# Patient Record
Sex: MALE | Race: WHITE | Employment: FULL TIME | URBAN - METROPOLITAN AREA
[De-identification: names, ages, dates, MRNs, and addresses within clinical notes are randomized per-mention and may not be internally consistent; named-entity substitution may affect disease eponyms.]

---

## 2017-03-09 ENCOUNTER — OFFICE VISIT - RIVER FALLS (OUTPATIENT)
Dept: FAMILY MEDICINE | Facility: CLINIC | Age: 52
End: 2017-03-09

## 2017-11-06 ENCOUNTER — AMBULATORY - RIVER FALLS (OUTPATIENT)
Dept: FAMILY MEDICINE | Facility: CLINIC | Age: 52
End: 2017-11-06

## 2017-12-15 ENCOUNTER — OFFICE VISIT - RIVER FALLS (OUTPATIENT)
Dept: FAMILY MEDICINE | Facility: CLINIC | Age: 52
End: 2017-12-15

## 2018-01-16 ENCOUNTER — COMMUNICATION - RIVER FALLS (OUTPATIENT)
Dept: FAMILY MEDICINE | Facility: CLINIC | Age: 53
End: 2018-01-16

## 2018-01-16 ENCOUNTER — OFFICE VISIT - RIVER FALLS (OUTPATIENT)
Dept: FAMILY MEDICINE | Facility: CLINIC | Age: 53
End: 2018-01-16

## 2018-01-16 ASSESSMENT — MIFFLIN-ST. JEOR: SCORE: 1841.64

## 2018-10-27 ENCOUNTER — OFFICE VISIT - RIVER FALLS (OUTPATIENT)
Dept: FAMILY MEDICINE | Facility: CLINIC | Age: 53
End: 2018-10-27

## 2018-10-27 ASSESSMENT — MIFFLIN-ST. JEOR: SCORE: 1851.62

## 2018-11-01 ENCOUNTER — OFFICE VISIT - RIVER FALLS (OUTPATIENT)
Dept: FAMILY MEDICINE | Facility: CLINIC | Age: 53
End: 2018-11-01

## 2018-11-02 LAB
CHOLEST SERPL-MCNC: 246 MG/DL
CHOLEST/HDLC SERPL: 3.9 {RATIO}
GLUCOSE BLD-MCNC: 92 MG/DL (ref 65–99)
HDLC SERPL-MCNC: 63 MG/DL
LDLC SERPL CALC-MCNC: 152 MG/DL
NONHDLC SERPL-MCNC: 183 MG/DL
TRIGL SERPL-MCNC: 170 MG/DL

## 2018-11-20 ENCOUNTER — OFFICE VISIT - RIVER FALLS (OUTPATIENT)
Dept: FAMILY MEDICINE | Facility: CLINIC | Age: 53
End: 2018-11-20

## 2018-11-20 ASSESSMENT — MIFFLIN-ST. JEOR: SCORE: 1867.26

## 2019-11-21 ENCOUNTER — COMMUNICATION - RIVER FALLS (OUTPATIENT)
Dept: FAMILY MEDICINE | Facility: CLINIC | Age: 54
End: 2019-11-21

## 2019-11-22 ENCOUNTER — OFFICE VISIT - RIVER FALLS (OUTPATIENT)
Dept: FAMILY MEDICINE | Facility: CLINIC | Age: 54
End: 2019-11-22

## 2019-11-22 ASSESSMENT — MIFFLIN-ST. JEOR: SCORE: 1874.52

## 2019-11-25 LAB
BASOPHILS # BLD MANUAL: 28 10*3/UL (ref 0–200)
BASOPHILS NFR BLD MANUAL: 0.6 %
BUN SERPL-MCNC: 20 MG/DL (ref 7–25)
BUN/CREAT RATIO - HISTORICAL: ABNORMAL (ref 6–22)
CALCIUM SERPL-MCNC: 10.2 MG/DL (ref 8.6–10.3)
CANCER AG125 SERPL-ACNC: 4 UNIT/ML
CHLORIDE BLD-SCNC: 100 MMOL/L (ref 98–110)
CHOLEST SERPL-MCNC: 267 MG/DL
CHOLEST/HDLC SERPL: 4.3 {RATIO}
CK SERPL-CCNC: 183 UNIT/L (ref 44–196)
CO2 SERPL-SCNC: 28 MMOL/L (ref 20–32)
CREAT SERPL-MCNC: 1.04 MG/DL (ref 0.7–1.33)
EGFRCR SERPLBLD CKD-EPI 2021: 81 ML/MIN/1.73M2
EOSINOPHIL # BLD MANUAL: 19 10*3/UL (ref 15–500)
EOSINOPHIL NFR BLD MANUAL: 0.4 %
ERYTHROCYTE [DISTWIDTH] IN BLOOD BY AUTOMATED COUNT: 12.4 % (ref 11–15)
ERYTHROCYTE [SEDIMENTATION RATE] IN BLOOD BY WESTERGREN METHOD: 17 MM/H
GLUCOSE BLD-MCNC: 104 MG/DL (ref 65–99)
HCT VFR BLD AUTO: 46.3 % (ref 38.5–50)
HDLC SERPL-MCNC: 62 MG/DL
HGB BLD-MCNC: 16.4 GM/DL (ref 13.2–17.1)
LDLC SERPL CALC-MCNC: 177 MG/DL
LYMPHOCYTES # BLD MANUAL: 935 10*3/UL (ref 850–3900)
LYMPHOCYTES NFR BLD MANUAL: 19.9 %
MCH RBC QN AUTO: 32.7 PG (ref 27–33)
MCHC RBC AUTO-ENTMCNC: 35.4 GM/DL (ref 32–36)
MCV RBC AUTO: 92.2 FL (ref 80–100)
MONOCYTES # BLD MANUAL: 437 10*3/UL (ref 200–950)
MONOCYTES NFR BLD MANUAL: 9.3 %
NEUTROPHILS # BLD MANUAL: 3281 10*3/UL (ref 1500–7800)
NEUTROPHILS NFR BLD MANUAL: 69.8 %
NONHDLC SERPL-MCNC: 205 MG/DL
PLATELET # BLD AUTO: 273 10*3/UL (ref 140–400)
PMV BLD: 10.3 FL (ref 7.5–12.5)
POTASSIUM BLD-SCNC: 4.8 MMOL/L (ref 3.5–5.3)
RBC # BLD AUTO: 5.02 10*6/UL (ref 4.2–5.8)
SODIUM SERPL-SCNC: 140 MMOL/L (ref 135–146)
TRIGL SERPL-MCNC: 141 MG/DL
WBC # BLD AUTO: 4.7 10*3/UL (ref 3.8–10.8)

## 2019-11-26 LAB — C REACTIVE PROTEIN LHE: 2.9 MG/L

## 2019-12-01 ENCOUNTER — COMMUNICATION - RIVER FALLS (OUTPATIENT)
Dept: FAMILY MEDICINE | Facility: CLINIC | Age: 54
End: 2019-12-01

## 2019-12-06 ASSESSMENT — MIFFLIN-ST. JEOR: SCORE: 1838.23

## 2019-12-26 ASSESSMENT — MIFFLIN-ST. JEOR: SCORE: 1814.65

## 2020-09-01 ENCOUNTER — OFFICE VISIT - RIVER FALLS (OUTPATIENT)
Dept: FAMILY MEDICINE | Facility: CLINIC | Age: 55
End: 2020-09-01

## 2020-09-01 LAB
ANION GAP SERPL CALCULATED.3IONS-SCNC: 9 MMOL/L (ref 5–18)
BUN SERPL-MCNC: 18 MG/DL (ref 8–25)
BUN/CREAT RATIO - HISTORICAL: 18 (ref 10–20)
CALCIUM SERPL-MCNC: 9.4 MG/DL (ref 8.5–10.5)
CHLORIDE BLD-SCNC: 105 MMOL/L (ref 98–110)
CO2 SERPL-SCNC: 27 MMOL/L (ref 21–31)
CREAT SERPL-MCNC: 0.99 MG/DL (ref 0.72–1.25)
ERYTHROCYTE [DISTWIDTH] IN BLOOD BY AUTOMATED COUNT: 12.5 % (ref 11.5–15.5)
ERYTHROCYTE [SEDIMENTATION RATE] IN BLOOD BY WESTERGREN METHOD: 15 MM/HR
GFR ESTIMATE EXT - HISTORICAL: >60
GLUCOSE BLD-MCNC: 115 MG/DL (ref 65–100)
HCT VFR BLD AUTO: 45.7 % (ref 37–53)
HGB BLD-MCNC: 16.2 G/DL (ref 13.5–17.5)
MCH RBC QN AUTO: 33.2 PG (ref 26–34)
MCHC RBC AUTO-ENTMCNC: 35.4 G/DL (ref 32–36)
MCV RBC AUTO: 94 FL (ref 80–100)
PLATELET # BLD AUTO: 214 10*3/UL (ref 140–440)
PMV BLD: 9.8 FL (ref 6.5–11)
POTASSIUM BLD-SCNC: 4.1 MMOL/L (ref 3.5–5)
RBC # BLD AUTO: 4.88 10*6/UL (ref 4.3–5.9)
SODIUM SERPL-SCNC: 141 MMOL/L (ref 135–145)
WBC # BLD AUTO: 4 10*3/UL (ref 4.5–11)

## 2020-09-02 ENCOUNTER — COMMUNICATION - RIVER FALLS (OUTPATIENT)
Dept: FAMILY MEDICINE | Facility: CLINIC | Age: 55
End: 2020-09-02

## 2020-09-02 LAB — C REACTIVE PROTEIN LHE: 1.5 MG/L

## 2020-09-09 ENCOUNTER — OFFICE VISIT - RIVER FALLS (OUTPATIENT)
Dept: FAMILY MEDICINE | Facility: CLINIC | Age: 55
End: 2020-09-09

## 2020-09-09 ASSESSMENT — MIFFLIN-ST. JEOR: SCORE: 1828.25

## 2020-09-10 ENCOUNTER — RECORDS - HEALTHEAST (OUTPATIENT)
Dept: ADMINISTRATIVE | Facility: OTHER | Age: 55
End: 2020-09-10

## 2021-01-25 ENCOUNTER — OFFICE VISIT - RIVER FALLS (OUTPATIENT)
Dept: FAMILY MEDICINE | Facility: CLINIC | Age: 56
End: 2021-01-25

## 2021-01-25 ASSESSMENT — MIFFLIN-ST. JEOR: SCORE: 1800.13

## 2021-02-12 ENCOUNTER — OFFICE VISIT - RIVER FALLS (OUTPATIENT)
Dept: FAMILY MEDICINE | Facility: CLINIC | Age: 56
End: 2021-02-12

## 2021-02-12 ENCOUNTER — HOSPITAL ENCOUNTER (OUTPATIENT)
Dept: CT IMAGING | Facility: CLINIC | Age: 56
Discharge: HOME | End: 2021-02-12
Attending: INTERNAL MEDICINE

## 2021-02-12 DIAGNOSIS — E78.5 HYPERLIPIDEMIA: ICD-10-CM

## 2021-02-12 LAB
CV CALCIUM SCORE AGATSTON LM: 0
CV CALCIUM SCORING AGATSON LAD: 4
CV CALCIUM SCORING AGATSTON CX: 18
CV CALCIUM SCORING AGATSTON RCA: 0
CV CALCIUM SCORING AGATSTON TOTAL: 22

## 2021-02-12 ASSESSMENT — MIFFLIN-ST. JEOR: SCORE: 1790.15

## 2021-02-17 LAB
BUN SERPL-MCNC: 18 MG/DL (ref 7–25)
BUN/CREAT RATIO - HISTORICAL: NORMAL (ref 6–22)
CALCIUM SERPL-MCNC: 10.1 MG/DL (ref 8.6–10.3)
CHLORIDE BLD-SCNC: 101 MMOL/L (ref 98–110)
CHOLEST SERPL-MCNC: 231 MG/DL
CHOLEST/HDLC SERPL: 3 {RATIO}
CO2 SERPL-SCNC: 29 MMOL/L (ref 20–32)
CREAT SERPL-MCNC: 0.98 MG/DL (ref 0.7–1.33)
EGFRCR SERPLBLD CKD-EPI 2021: 86 ML/MIN/1.73M2
GLUCOSE BLD-MCNC: 94 MG/DL (ref 65–99)
HDLC SERPL-MCNC: 76 MG/DL
LDLC SERPL CALC-MCNC: 134 MG/DL
NONHDLC SERPL-MCNC: 155 MG/DL
POTASSIUM BLD-SCNC: 4.4 MMOL/L (ref 3.5–5.3)
SODIUM SERPL-SCNC: 140 MMOL/L (ref 135–146)
TRIGL SERPL-MCNC: 104 MG/DL
VITAMIN B6: 24.3 NG/ML (ref 2.1–21.7)

## 2021-04-14 ENCOUNTER — COMMUNICATION - RIVER FALLS (OUTPATIENT)
Dept: FAMILY MEDICINE | Facility: CLINIC | Age: 56
End: 2021-04-14

## 2021-06-27 ENCOUNTER — HEALTH MAINTENANCE LETTER (OUTPATIENT)
Age: 56
End: 2021-06-27

## 2021-10-17 ENCOUNTER — HEALTH MAINTENANCE LETTER (OUTPATIENT)
Age: 56
End: 2021-10-17

## 2022-02-11 VITALS
BODY MASS INDEX: 29.12 KG/M2 | SYSTOLIC BLOOD PRESSURE: 128 MMHG | WEIGHT: 209.8 LBS | SYSTOLIC BLOOD PRESSURE: 132 MMHG | HEIGHT: 72 IN | DIASTOLIC BLOOD PRESSURE: 78 MMHG | DIASTOLIC BLOOD PRESSURE: 82 MMHG | BODY MASS INDEX: 30.24 KG/M2 | BODY MASS INDEX: 28.42 KG/M2 | WEIGHT: 215 LBS | HEART RATE: 92 BPM | HEIGHT: 72 IN | SYSTOLIC BLOOD PRESSURE: 146 MMHG | HEIGHT: 72 IN | DIASTOLIC BLOOD PRESSURE: 80 MMHG | TEMPERATURE: 98.7 F

## 2022-02-11 VITALS
DIASTOLIC BLOOD PRESSURE: 76 MMHG | HEIGHT: 72 IN | WEIGHT: 206.6 LBS | SYSTOLIC BLOOD PRESSURE: 158 MMHG | WEIGHT: 204.4 LBS | HEART RATE: 111 BPM | OXYGEN SATURATION: 97 % | TEMPERATURE: 99.2 F | SYSTOLIC BLOOD PRESSURE: 136 MMHG | BODY MASS INDEX: 27.68 KG/M2 | HEIGHT: 72 IN | OXYGEN SATURATION: 96 % | BODY MASS INDEX: 27.98 KG/M2 | DIASTOLIC BLOOD PRESSURE: 80 MMHG | TEMPERATURE: 97.2 F | HEART RATE: 88 BPM

## 2022-02-11 VITALS
HEART RATE: 64 BPM | BODY MASS INDEX: 28.82 KG/M2 | HEART RATE: 64 BPM | BODY MASS INDEX: 29.16 KG/M2 | HEIGHT: 72 IN | SYSTOLIC BLOOD PRESSURE: 160 MMHG | DIASTOLIC BLOOD PRESSURE: 104 MMHG | DIASTOLIC BLOOD PRESSURE: 80 MMHG | TEMPERATURE: 98.2 F | SYSTOLIC BLOOD PRESSURE: 152 MMHG | WEIGHT: 212.8 LBS | TEMPERATURE: 96 F | WEIGHT: 215 LBS

## 2022-02-11 VITALS
TEMPERATURE: 97.7 F | BODY MASS INDEX: 28.68 KG/M2 | HEART RATE: 60 BPM | SYSTOLIC BLOOD PRESSURE: 140 MMHG | WEIGHT: 214.4 LBS | DIASTOLIC BLOOD PRESSURE: 78 MMHG

## 2022-02-11 VITALS
TEMPERATURE: 96.9 F | RESPIRATION RATE: 18 BRPM | TEMPERATURE: 97.4 F | WEIGHT: 223 LBS | HEIGHT: 73 IN | OXYGEN SATURATION: 98 % | SYSTOLIC BLOOD PRESSURE: 136 MMHG | BODY MASS INDEX: 28.36 KG/M2 | HEART RATE: 88 BPM | RESPIRATION RATE: 16 BRPM | OXYGEN SATURATION: 95 % | WEIGHT: 214 LBS | DIASTOLIC BLOOD PRESSURE: 78 MMHG | DIASTOLIC BLOOD PRESSURE: 82 MMHG | HEART RATE: 72 BPM | SYSTOLIC BLOOD PRESSURE: 132 MMHG

## 2022-02-11 VITALS
HEIGHT: 73 IN | BODY MASS INDEX: 29.99 KG/M2 | WEIGHT: 221.4 LBS | HEART RATE: 80 BPM | HEART RATE: 94 BPM | HEIGHT: 72 IN | BODY MASS INDEX: 28.65 KG/M2 | TEMPERATURE: 98 F | WEIGHT: 216.2 LBS | DIASTOLIC BLOOD PRESSURE: 80 MMHG | TEMPERATURE: 97.9 F | SYSTOLIC BLOOD PRESSURE: 134 MMHG | DIASTOLIC BLOOD PRESSURE: 88 MMHG | OXYGEN SATURATION: 97 % | SYSTOLIC BLOOD PRESSURE: 138 MMHG

## 2022-02-16 NOTE — NURSING NOTE
Comprehensive Intake Entered On:  9/9/2020 8:12 AM CDT    Performed On:  9/9/2020 8:08 AM CDT by Beckie Canchola CMA               Summary   Chief Complaint :   f/u per [facility] - ha's improved, BP improving, ringing in ears improved    Advance Directive :   No   Weight Measured :   212.8 lb(Converted to: 212 lb 13 oz, 96.52 kg)    Height Measured :   72 in(Converted to: 6 ft 0 in, 182.88 cm)    Body Mass Index :   28.86 kg/m2 (HI)    Body Surface Area :   2.21 m2   Systolic Blood Pressure :   156 mmHg (HI)    Diastolic Blood Pressure :   80 mmHg   Mean Arterial Pressure :   105 mmHg   Peripheral Pulse Rate :   64 bpm   Temperature Tympanic :   96 DegF(Converted to: 35.6 DegC)  (LOW)    Race :      Languages :   English   Beckie Canchola CMA - 9/9/2020 8:08 AM CDT   Health Status   Allergies Verified? :   Yes   Medication History Verified? :   Yes   Medical History Verified? :   Yes   Pre-Visit Planning Status :   Completed   Tobacco Use? :   Never smoker   Beckie Canchola CMA - 9/9/2020 8:08 AM CDT   Demographics   Last Name :   Padilla   Address :   [address] [place] [address]   First Name :   Crow Alejandro Initial :   BETH   City :   [place]   State :   [place]   Zip Code :   [address]   Beckie Canchola CMA - 9/9/2020 8:08 AM CDT   Providers Grid   Provider Name :    Dr. Onofre              Provider Specialty :    Dentist                Beckie Canchola CMA - 9/9/2020 8:08 AM CDT         Ancillary Services Grid   Name :    Radha              Type of Service :    Pharmacy                Beckie Canchola CMA - 9/9/2020 8:08 AM CDT         ID Risk Screen   Recent Travel History :   No recent travel   Family Member Travel History :   No recent travel   Other Exposure to Infectious Disease :   Unknown   Beckie Canchola CMA - 9/9/2020 8:08 AM CDT   Social History   Social History   (As Of: 9/9/2020 8:12:01 AM CDT)   Alcohol:        Current, Beer (12 oz), Wine (5 oz), 3-5 times per week, 2 drinks/episode average.  3 drinks/episode maximum.    (Last Updated: 3/9/2017 2:23:21 PM CST by Adwoa Esqueda)          Tobacco:        Never   (Last Updated: 2/6/2012 5:14:03 PM CST by Beckie Canchola CMA)          Substance Abuse:        Never   (Last Updated: 11/8/2010 5:56:17 PM CST by Yasmine Jaime)          Employment/School:        Employed, Work/School description: .   (Last Updated: 2/22/2012 11:29:03 AM CST by Alexandra Antunez CMA)          Home/Environment:        Marital status: .   (Last Updated: 2/22/2012 11:29:17 AM CST by Alexandra Antunez CMA)          Exercise:        Exercise frequency: 1-2 times/week.   (Last Updated: 11/8/2010 5:51:52 PM CST by Yasmine Jaime)   Principal Discharge DX:	Facial laceration, initial encounter

## 2022-02-16 NOTE — NURSING NOTE
Comprehensive Intake Entered On:  11/22/2019 3:33 PM CST    Performed On:  11/22/2019 3:27 PM CST by Mendy Del Valle               Summary   Chief Complaint :   Annual px, c/o tightness/ tingling in arms, pain and swelling in armpits, numbness in hands, lips are tingling, x 2 weeks   Advance Directive :   No   Weight Measured :   223.0 lb(Converted to: 223 lb 0 oz, 101.15 kg)    Height Measured :   72 in(Converted to: 6 ft 0 in, 182.88 cm)    Body Mass Index :   30.24 kg/m2 (HI)    Body Surface Area :   2.26 m2   Systolic Blood Pressure :   168 mmHg (HI)    Diastolic Blood Pressure :   90 mmHg (HI)    Mean Arterial Pressure :   116 mmHg   Peripheral Pulse Rate :   92 bpm   BP Site :   Right arm   Pulse Site :   Radial artery   BP Method :   Manual   HR Method :   Manual   Temperature Tympanic :   98.7 DegF(Converted to: 37.1 DegC)    Race :      Languages :   English   Mendy Del Valle - 11/22/2019 3:27 PM CST   Health Status   Allergies Verified? :   Yes   Medication History Verified? :   Yes   Medical History Verified? :   Yes   Pre-Visit Planning Status :   Completed   Tobacco Use? :   Never smoker   Mendy Del Valle - 11/22/2019 3:27 PM CST   Consents   Consent for Immunization Exchange :   Consent Granted   Consent for Immunizations to Providers :   Consent Granted   Mendy Del Valle - 11/22/2019 3:27 PM CST   Meds / Allergies   (As Of: 11/22/2019 3:33:41 PM CST)   Allergies (Active)   No known allergies  Estimated Onset Date:   Unspecified ; Created By:   Adriana March RN; Reaction Status:   Active ; Category:   Drug ; Substance:   No known allergies ; Type:   Allergy ; Updated By:   Adriana March RN; Reviewed Date:   11/22/2019 3:30 PM CST        Medication List   (As Of: 11/22/2019 3:33:41 PM CST)   No Known Home Medications     Mendy Del Valle - 11/22/2019 3:30:48 PM      Prescription/Discharge Order    oseltamivir  :   oseltamivir ; Status:   Completed ; Ordered As Mnemonic:    Tamiflu 75 mg oral capsule ; Simple Display Line:   75 mg, 1 cap(s), PO, BID, for 5 day(s), 10 cap(s), 0 Refill(s) ; Ordering Provider:   Prudencio lOivo MD; Catalog Code:   oseltamivir ; Order Dt/Tm:   3/1/2019 10:55:57 AM CST

## 2022-02-16 NOTE — PROGRESS NOTES
Chief Complaint    c/o bilateral arm tingling x2 weeks.  fell on ice, feels that is what is causing issues.  History of Present Illness       Patient with tingling in his fingers       2 weeks ago he slipped and fell backwards onto his back.  It was a hard fall and shook him up he did not have any specific pain just some general aching but he did develop some tingling in his fingertips both hands involved equally.  He does not have any neck pain but now 2 weeks later he still has a little tingling in his fingers.  He has been very active with vigorous activities and did not struggle or have the sensation change.  He is especially worried because he is leaving for a [email or web address] vacation for the next week tomorrow.  Review of Systems       No neck pain, no headaches, no visual changes, no arm weakness, no chest pain  Physical Exam   Vitals & Measurements    T: 97.2  F (Tympanic)  HR: 111 (Peripheral)  BP: 158/76  SpO2: 97%     HT: 72 in  WT: 206.6 lb  BMI: 28.02        Alert and oriented       Normal strength in the shoulders elbows wrist and hand no neck tenderness on palpation sensations grossly intact but he does have a subjective tingling mostly in the fingertips of both hands  Assessment/Plan       1. Paresthesia (R20.2)          Several weeks out from a fall having some paresthesias that are bilateral he certainly could have had some swelling that resulted in his sensation but it is very reassuring that he has no neck pain and he has been very physically active without difficulty recommended a course of prednisone if after his vacation he still having symptoms some cervical imaging would be appropriate       Orders:         predniSONE, = 1 tab(s) ( 20 mg ), PO, Daily, # 7 tab(s), 0 Refill(s), Type: Maintenance, Pharmacy: [id number], 1 tab(s) Oral daily,x7 day(s), 72, in, 01/25/21 14:07:00 CST, Height Measured, 206.6, lb, 01/25/21 14:07:00 CST, Weight Measured, (Ordered)  Patient Information      Name:CHESTER MEDELLIN      Address:      [address] [place] [phone]     Sex:Male     YOB: 1965     [phone]     Emergency Contact:JAZMYN MEDELLIN     MRN:[id number]     FIN:[id number]     Location:[facility]     Date of Service:01/25/2021      Primary Care Physician:       Prudencio Olivo MD, [phone]      Attending Physician:       Doron Evans MD, [phone]  Problem List/Past Medical History    Ongoing     Corneal scarring       Comments: right eye superiorly - most likely seconardy to herpes zoster     GERD (gastroesophageal reflux disease)     Hiatal hernia     IBS (irritable bowel syndrome)     Obesity    Historical     Epidermoid cyst     HA (headache)     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     Coronary artery calcium score (06/27/2014)      Comments: Agatston Calcium score - 0.     Vasectomy (2004)     Cholecystectomy (08/1998)     History of repair of inguinal hernia (1974)  Medications    predniSONE 20 mg oral tablet, 20 mg= 1 tab(s), Oral, daily  Allergies    No Known Medication Allergies  Social History    Smoking Status     Never smoker     Alcohol      Current, Beer (12 oz), Wine (5 oz), 3-5 times per week, 2 drinks/episode average. 3 drinks/episode maximum.     Electronic Cigarette/Vaping      Electronic Cigarette Use: Never.     Employment/School      Employed, Work/School description: .     Exercise      Exercise frequency: 1-2 times/week.     Home/Environment      Marital status: .     Substance Abuse      Never     Tobacco      Never (less than 100 in lifetime)  Family History    Deep vein thrombosis: Father.    Kidney stone: Brother.    Suicide:  Father.  Immunizations      Vaccine Date Status          tetanus/diphth/pertuss (Tdap) adult/adol 12/13/2018 Recorded          influenza virus vaccine, inactivated 12/13/2018 Recorded          tetanus/diphth/pertuss (Tdap) adult/adol 12/18/2007 Recorded

## 2022-02-16 NOTE — LETTER
(Inserted Image. Unable to display)     November 29, 2019      CHESTER MEDELLIN  [address], [place] [phone]          Dear CHESTER,      Thank you for selecting [facility] (previously [facility]) for your healthcare needs.      Our records indicate you are due for the following services:     Annual Physical.      To schedule an appointment or if you have further questions, please contact your primary clinic:   [facility]       [phone]   [facility]       [phone]              [facility]     [phone]      Powered by [employer] and [email or web address]    Sincerely,    Prudencio Olivo MD

## 2022-02-16 NOTE — NURSING NOTE
Quick Intake Entered On:  11/29/2019 11:01 AM CST    Performed On:  11/29/2019 11:01 AM CST by Kallie Bradshaw RN               Summary   Advance Directive :   No   Height Measured :   72 in(Converted to: 6 ft 0 in, 182.88 cm)    Systolic Blood Pressure :   146 mmHg (HI)    Diastolic Blood Pressure :   82 mmHg (HI)    Mean Arterial Pressure :   103 mmHg   Race :      Languages :   English   Kallie Bradshaw RN - 11/29/2019 11:01 AM CST

## 2022-02-16 NOTE — NURSING NOTE
Comprehensive Intake Entered On:  12/6/2019 8:42 AM CST    Performed On:  12/6/2019 8:42 AM CST by Larissa Murphy               Summary   Chief Complaint :   BP and weight check   Advance Directive :   No   Weight Measured :   215 lb(Converted to: 215 lb 0 oz, 97.52 kg)    Height Measured :   72 in(Converted to: 6 ft 0 in, 182.88 cm)    Body Mass Index :   29.16 kg/m2 (HI)    Body Surface Area :   2.22 m2   Systolic Blood Pressure :   128 mmHg   Diastolic Blood Pressure :   84 mmHg (HI)    Mean Arterial Pressure :   99 mmHg   Race :      Languages :   English   Larissa Murphy - 12/6/2019 8:42 AM CST

## 2022-02-16 NOTE — PROGRESS NOTES
Patient:   CHESTER MEDELLIN            MRN: [id number]            FIN: [id number]               Age:   56 years     Sex:  Male     :  1965   Associated Diagnoses:   Hyperlipemia; Physical exam; Peripheral neuropathy   Author:   Prudencio Olivo MD      Visit Information      Date of Service: 2021 04:35 pm  [facility]  Encounter#: [id number]      Primary Care Provider (PCP):  Prudencio Olivo MD    NPI# [id number]      Referring Provider:  Prudencio Olivo MD    NPI# [id number]      Chief Complaint   2021 4:47 PM CST    1.  f/u parestesia in BL arsm- noticed more sx after the hard fall a few wks ago, ongoing lethargy.  2.  review coronary calcium score from today  3.  ST x today              Additional Information:No additional information recorded during visit.   Chief complaint and symptoms as noted above and confirmed with patient      History of Present Illness       2020: Crow presents to clinic for 1 week follow-up after seeing [facility] this past week for sinus congestion and sinusitis complaints.  Labs unremarkable except for a mild leukopenia.  He shares having off-and-on symptoms for nearly a month with this illness.  His wife had similar episode as well.  Neither were tested for coronavirus.  He feels much better compared to last week.  Did take Sudafed on multiple days.  Brings in a recording of home blood pressures with systolic pressures typically in the 130s.  21: Crow presents to clinic with continued paresthesias.  Seen by JAYLON late January with similar symptoms lasting for approximately 2 weeks.  Describes a numbness sensation primarily in the fingertips involving both hands.  Some numbness features from elbows on down in both arms.  Describes mainly a symmetric like sensations for both hands.  No associated pains no weakness.  No neck pains.  Does describe similar like numb feelings going down both legs and in the toes.  No balance difficulties.  No  history of similar symptoms in the past.  Also describes approximately 3-day history of general malaise and sore throat.  Recently returned from 1 week vacation in [place] approximately 1 week ago.  He did require traveling Covid testing prior to the departure which was normal.          Review of Systems   Constitutional:  No fever, No chills.    Eye:  Negative except as documented in history of present illness.    Ear/Nose/Mouth/Throat:  No nasal congestion.    Respiratory:  No shortness of breath, No cough, No sputum production.    Cardiovascular:  No chest pain, No palpitations, No peripheral edema, No syncope.    Gastrointestinal:  No nausea, No vomiting, No abdominal pain.    Genitourinary   Hematology/Lymphatics:  No swollen lymph glands.    Endocrine   Immunologic:  No malaise.    Musculoskeletal:  No joint pain, No muscle pain.    Neurologic:  Alert and oriented X4, Numbness, Tingling, Headache.       Health Status   Allergies:    Allergic Reactions (Selected)  No Known Medication Allergies   Medications:  (Selected)   ,    Medications          No Known Home Medications     Problem list:    All Problems  Corneal scarring / SNOMED CT 557199169 / Confirmed  Hiatal hernia / SNOMED CT 212052808 / Confirmed  IBS (irritable bowel syndrome) / SNOMED CT 96053782 / Confirmed  Obesity / SNOMED CT 8479140171 / Probable  Inactive: GERD (gastroesophageal reflux disease) / SNOMED CT 602748298  Resolved: Epidermoid cyst / SNOMED CT 9961104796  Resolved: HA (headache) / SNOMED CT 12153388  Resolved: Herpes zoster / SNOMED CT 7886753  Resolved: History of inguinal hernia repair / SNOMED CT 901714U0-Y5A2-29KO-[id number]  Resolved: Urticaria / SNOMED CT 36906719  Canceled: Hypothyroidism / ICD-9-.9      Histories   Past Medical History:    Active  Corneal scarring (217478740): Onset in 2007 at 41 years.  Comments:  10/29/2012 CDT 12:05 PM CDT - Amy Bearden  right eye superiorly - most likely seconardy  to herpes zoster  Hiatal hernia (552283754)  IBS (irritable bowel syndrome) (75100039)  Resolved  Epidermoid cyst (2957086315): Onset on 2012 at 47 years.  Resolved.  Herpes zoster (8034053): Onset in the month of 2004 at 39 years  Resolved.  Comments:  2010 CST 5:54 PM CST - Yasmine Jaime  trigeminal nerve ophthalmic branch distribution  History of inguinal hernia repair ([id number]): Onset in  at 9 years.  Resolved.  Urticaria (84681050):  Resolved in  at 34 years.  HA (headache) (14229498):  Resolved.   Family History:    Suicide  Father ()  Kidney stone  Brother  Deep vein thrombosis  Father ()     Procedure history:    Cardiac computed tomography for calcium scoring (6937778030) on 2021 at 56 Years.  Comments:  2021 4:36 PM CST - Trina Milton  Total Agatston calcium score is 22.  Screening for colon cancer (8432397214) on 12/10/2018 at 53 Years.  Comments:  2018 3:50 PM CST - Becky Pimentel MA result:  negative  Recommendation:  f/u 3yrs  Coronary artery calcium score (2000437484) on 2014 at 49 Years.  Comments:  2016 7:22 AM CST - Beckie Canchola CMA  Agatston Calcium score - 0  Vasectomy (63.73) in  at 39 Years.  Cholecystectomy (59818760) in the month of 1998 at 33 Years.  History of repair of inguinal hernia (6351123055) in  at 9 Years.   Social History:        Electronic Cigarette/Vaping Assessment            Electronic Cigarette Use: Never.      Alcohol Assessment            Current, Beer (12 oz), Wine (5 oz), 3-5 times per week, 2 drinks/episode average.  3 drinks/episode maximum.      Tobacco Assessment            Never (less than 100 in lifetime)      Substance Abuse Assessment            Never      Employment and Education Assessment            Employed, Work/School description: .      Home and Environment Assessment            Marital status: .      Exercise and  Physical Activity Assessment            Exercise frequency: 1-2 times/week.        Physical Examination   vital signs stable, as noted above   VS/Measurements   General:  Alert and oriented, No acute distress.    Neck:  Supple, Non-tender, No lymphadenopathy.    Respiratory:  Lungs are clear to auscultation, Respirations are non-labored, Breath sounds are equal.    Cardiovascular:  Normal rate, Regular rhythm, No murmur, No edema.    Musculoskeletal:  normal phalen,tinnels test.    Neurologic:  Alert, Oriented.    Cognition and Speech:  Oriented, Speech clear and coherent.    Psychiatric:  Appropriate mood & affect.       Review / Management   Results review      Impression and Plan   Diagnosis     Hyperlipemia (DWG68-HC E78.5).     Physical exam (RBQ97-HO Z00.00).     Peripheral neuropathy (KAW30-JO G62.9).       .) sub-acute (2 month) h/o paresthesias, symmetric involvement through forearms/hands (all fingers); aggravated at night with particular positioning   - seen in clinic on 1/25 with same complaints - focus on cervical origin (had fallen while walking dog) - no symptom improvement on prednisone    - no associated weakness, no neck pains    - possibly some CTS features, though symmetry and foot involvement argues against    - mild numbness in feet as well   - non-diabetic   - will check B12, B6, TSH, MMA, SPEP   - if persists, would have him see neurology for EMG, reassessment    .) HLPD   - low risk for future cardiac events   - normal coronary calcium scoring (6/2014)   - repeat scan (2/201): raw score 22 (25-50th percentile)    .) generalized malaise   - [place]; pre-travel COVID test negative   - consensus to hold off on covid testing today    plan as previously outlined:     .) health maintenance   - CRC due 12/21   - declines PSA screening   - adult vaccinations updated   - h/o superficial thrombophlebitis    RTC in 6 months

## 2022-02-16 NOTE — PROGRESS NOTES
Patient:   CHESTER MEDELLIN            MRN: [id number]            FIN: [id number]               Age:   52 years     Sex:  Male     :  1965   Associated Diagnoses:   Thrombophlebitis; Superficial vein thrombosis   Author:   Vaughn Interiano PA-C      Chief Complaint   2018 11:43 AM CST   Pt here for redness and pain on inner left knee/inner thigh x 9 days  (Modified)      History of Present Illness   Chief complaint and symptoms noted above and confirmed with patient   as above,  tightness and tenderness of medial distal thigh  no pain with exercise  no treatments  no recent travel hx      Review of Systems   Constitutional:  Negative.    Ear/Nose/Mouth/Throat:  Negative.    Respiratory:  Negative.    Cardiovascular:  Negative.       Health Status   Allergies:    Allergic Reactions (Selected)  No known allergies   Medications: not on any regular medications   Problem list:    All Problems (Selected)  Hiatal hernia / SNOMED CT 862149136 / Confirmed  Corneal Scarring / ICD-9-.00 / Confirmed  IBS (Irritable Bowel Syndrome) / ICD-9-.1 / Confirmed      Histories   Past Medical History:    Active  Corneal Scarring (371.00): Onset in  at 41 years.  Comments:  10/29/2012 CDT 12:05 PM CDT - Amy Bearden  right eye superiorly - most likely seconardy to herpes zoster  Hiatal hernia (473408286)  IBS (Irritable Bowel Syndrome) (564.1)  Resolved  Epidermoid Cyst (706.2): Onset on 2012 at 47 years.  Resolved.  Herpes Zoster (053.9): Onset in the month of 2004 at 39 years  Resolved.  Comments:  2010 CST 5:54 PM CST - Yasmine Jaime  trigeminal nerve ophthalmic branch distribution  History of inguinal hernia repair ([id number]): Onset in  at 9 years.  Resolved.  Urticaria (708.9):  Resolved in  at 34 years.  HA (Headache) (784.0):  Resolved.   Family History:    Suicide  Father ()  Kidney stone  Brother  Deep vein thrombosis  Father ()      Procedure history:    Coronary artery calcium score (6978431622) on 6/27/2014 at 49 Years.  Comments:  2/9/2016 7:22 AM - Beckie Canchola CMAtston Calcium score - 0  Vasectomy (63.73) in 2004 at 39 Years.  Cholecystectomy (23772153) in the month of 8/1998 at 33 Years.  History of repair of inguinal hernia (7554582018) in 1974 at 9 Years.      Physical Examination   Vital Signs   1/16/2018 11:43 AM CST Temperature Tympanic 96.9 DegF  LOW    Peripheral Pulse Rate 72 bpm    Pulse Site Radial artery    Respiratory Rate 16 br/min    Systolic Blood Pressure 136 mmHg  HI    Diastolic Blood Pressure 78 mmHg    Mean Arterial Pressure 97 mmHg    BP Site Right arm    Oxygen Saturation 98 %      Measurements from flowsheet : Measurements   1/16/2018 11:43 AM CST Height Measured - Standard 72.50 in    Weight Measured - Standard 214 lb    BSA 2.23 m2    Body Mass Index 28.62 kg/m2  HI      General:  No acute distress.    Respiratory:  Lungs are clear to auscultation.    Cardiovascular:  Normal rate, Regular rhythm, No murmur, on medial aspect of left inner thigh there is a 4 cm ropy vein, mildly tender, slight inflammation, no pain with flexion and extension of leg.       Review / Management   Results review:  Lab results   1/16/2018 12:19 PM CST D-Dimer 1.70    D-Dimer Interp Abnormal   .    Radiology results   left leg U/S is negative for DVT, but positive for superficial thrombus in greater saphenous vein >5 cm       Impression and Plan   Diagnosis     Thrombophlebitis (QUV85-GC I80.9).     Superficial vein thrombosis (JNC77-JI I82.890).     Summary:  will treat with Xarelto 10 mg qd for 45 days, repeat U/S in 60 days, follow up if other sxs develop.    Orders     Orders   Requests (Lab):  D-Dimer (Request) (Order): Priority: Urgent, Thrombophlebitis.     Orders   Requests (Radiology):  US Lower Extremity Venous Doppler (Request) (Order): Instructions: Left distal medial thigh, Thrombophlebitis.     Orders    Pharmacy:  Xarelto 10 mg oral tablet (Prescribe): 1 tab(s) ( 10 mg ), po, daily, # 45 tab(s), 0 Refill(s), Type: Maintenance, [facility], 1 tab(s) po daily.     Orders   Requests (Radiology):  US Lower Extremity Venous Doppler (Request) (Order): Instructions: Left SVT, Superficial vein thrombosis.     Orders   Charges (Evaluation and Management):  24422 office outpatient visit 15 minutes (Charge) (Order): Quantity: 1, Superficial vein thrombosis.

## 2022-02-16 NOTE — NURSING NOTE
CAGE Assessment Entered On:  9/9/2020 12:55 PM CDT    Performed On:  9/9/2020 12:55 PM CDT by Beckie Canchola CMA               Assessment   Have you ever felt you should cut down on your drinking :   No   Have people annoyed you by criticizing your drinking :   No   Have you ever felt bad or guilty about your drinking :   No   Have you ever taken a drink first thing in the morning to steady your nerves or get rid of a hangover (Eye-opener) :   No   CAGE Score :   0    Beckie Canchola CMA - 9/9/2020 12:55 PM CDT

## 2022-02-16 NOTE — NURSING NOTE
Comprehensive Intake Entered On:  1/25/2021 2:12 PM CST    Performed On:  1/25/2021 2:07 PM CST by Margarito BENNETT, Becky               Summary   Chief Complaint :   c/o bilateral arm tingling x2 weeks.  fell on ice, feels that is what is causing issues.   Advance Directive :   No   Weight Measured :   206.6 lb(Converted to: 206 lb 10 oz, 93.712 kg)    Height Measured :   72 in(Converted to: 6 ft 0 in, 182.88 cm)    Body Mass Index :   28.02 kg/m2 (HI)    Body Surface Area :   2.18 m2   Systolic Blood Pressure :   158 mmHg (HI)    Diastolic Blood Pressure :   76 mmHg   Mean Arterial Pressure :   103 mmHg   Peripheral Pulse Rate :   111 bpm (HI)    BP Site :   Right arm   Pulse Site :   Radial artery   BP Method :   Manual   HR Method :   Manual   Temperature Tympanic :   97.2 DegF(Converted to: 36.2 DegC)  (LOW)    Oxygen Saturation :   97 %   Race :      Languages :   English   Becky Pimentel MA - 1/25/2021 2:07 PM CST   Health Status   Allergies Verified? :   Yes   Medication History Verified? :   Yes   Medical History Verified? :   Yes   Pre-Visit Planning Status :   Completed   Tobacco Use? :   Never smoker   Becky Pimentel MA - 1/25/2021 2:07 PM CST   Consents   Consent for Immunization Exchange :   Consent Granted   Consent for Immunizations to Providers :   Consent Granted   Becky Pimentel MA - 1/25/2021 2:07 PM CST   Meds / Allergies   (As Of: 1/25/2021 2:12:20 PM CST)   Allergies (Active)   No Known Medication Allergies  Estimated Onset Date:   Unspecified ; Created By:   Beckie Canchola CMA; Reaction Status:   Active ; Category:   Drug ; Substance:   No Known Medication Allergies ; Type:   Allergy ; Updated By:   Beckie Canchola CMA; Reviewed Date:   11/22/2019 5:04 PM CST        Medication List   (As Of: 1/25/2021 2:12:20 PM CST)   No Known Home Medications     Becky Pimentel MA - 1/25/2021 2:06:37 PM           ID Risk Screen   Recent Travel History :   No recent travel   Family Member Travel History  :   No recent travel   Other Exposure to Infectious Disease :   Unknown   COVID-19 Testing Status :   No positive COVID-19 test   Becky Pimentel MA - 1/25/2021 2:07 PM CST   Social History   Social History   (As Of: 1/25/2021 2:12:20 PM CST)   Alcohol:        Current, Beer (12 oz), Wine (5 oz), 3-5 times per week, 2 drinks/episode average.  3 drinks/episode maximum.   (Last Updated: 3/9/2017 2:23:21 PM CST by Adwoa Esqueda)          Tobacco:        Never (less than 100 in lifetime)   (Last Updated: 1/25/2021 2:07:32 PM CST by Becky Pimentel MA)          Electronic Cigarette/Vaping:        Electronic Cigarette Use: Never.   (Last Updated: 1/25/2021 2:07:36 PM CST by Becky Pimentel MA)          Substance Abuse:        Never   (Last Updated: 11/8/2010 5:56:17 PM CST by Yasmine Jaime)          Employment/School:        Employed, Work/School description: .   (Last Updated: 2/22/2012 11:29:03 AM CST by Alexandra Antunez CMA)          Home/Environment:        Marital status: .   (Last Updated: 2/22/2012 11:29:17 AM CST by Alexandra Antunez CMA)          Exercise:        Exercise frequency: 1-2 times/week.   (Last Updated: 11/8/2010 5:51:52 PM CST by Yasmine Jaime)

## 2022-02-16 NOTE — TELEPHONE ENCOUNTER
---------------------  From: Shaista Kaur LPN (Phone Messages Pool ([id number]_[facility]))   To: Prudencio Olivo MD;     Sent: 3/1/2019 9:08:22 AM CST  Subject: tamiflu request     Phone Message    PCP:   RAUL      Time of Call:  9:04am       Person Calling:  pt  Phone number:  [phone]    Note:   Pt LM asking for a Rx for tamiflu. Pt says he started getting symptoms yesterday- fever and chest congestion.    Pt says he is leaving for a family vacation tomorrow and is hoping to get Rx.    Last office visit and reason:  11/20/18 Px---------------------  From: Prudencio Olivo MD   To: [facility] ([id number]_[place] [facility] [place]);     Sent: 3/1/2019 10:27:18 AM CST  Subject: RE: tamiflu request     I'm okay with this, as long as he knows that influenza is a clinical diagnosis and hard to confirm remotely.  Also, given his good general healthy, if indeed flu, I anticipate he would recover with minimal risk for complications, though starting tamiflu early could reduce the duration of illness. Tamiflu 75mg BID x 5 days  ** Submitted: **  Order:oseltamivir (Tamiflu 75 mg oral capsule)  1 cap(s)  PO  BID  Qty:  10 cap(s)        Duration:  5 day(s)        Refills:  0          Substitutions Allowed     Route To Pharmacy - [facility] [id number]    Signed by Beckie Canchola CMA  3/1/2019 10:55:00 AMcontacted pt and LM on VM that rx sent to [facility] along with [facility]'s note.

## 2022-02-16 NOTE — PROGRESS NOTES
Patient:   CHESTER MEDELLIN            MRN: [id number]            FIN: [id number]               Age:   52 years     Sex:  Male     :  1965   Associated Diagnoses:   HLD (Hyperlipidemia); Myalgia and myositis   Author:   Prudencio Olivo MD      Visit Information      Date of Service: 12/15/2017 02:40 pm  Performing Location: [facility]  Encounter#: [id number]      Primary Care Provider (PCP):  Prudencio Olivo MD    NPI# [id number]      Referring Provider:  Prudencio Olivo MD    NPI# [id number]      Chief Complaint   12/15/2017 2:56 PM CST   chest congestion              Additional Information:No additional information recorded during visit.   Chief complaint and symptoms as noted above and confirmed with patient      History of Present Illness   3/14/2014:  Presents to clinic for annual evaluation.  Also feeling more stressed at work (), notices waking up in AM with clenched, sore jaw.  Wanting to get his labs done.  Thinks symptoms will improve with better stress management.  He's not too worried about this.  No significant PMHx.  No other voiced complaints.      2016: Presents to clinic with several week history of muscle tightness and soreness especially across his chest and upper arms.  No change in physical behaviors.  He can walk on a treadmill without any limitations.  He did have a normal coronary calcium score in  while he was maintained on statin therapy at that time.  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  Denies any significant reflux symptoms.  No recent illnesses or upper respiratory tract features.    3/9/2017: Presents to clinic with several month history of recurrent periumbilical bloating and discomfort.  Appears to have a prandial relationship.  No stool changes.  He has tried Prilosec on a few occasions it does seem to help with the pain.  No real epigastric component.  Denies any more classic reflux symptoms.   He has noticed a periumbilical bulge for approximately the last year.  It does seem that his describe pains are in that general area.  He never has any sharp, strangulating type pains.  He has pursued for card testing for colorectal cancer screening.  No previous endoscopic history.  Denies any NSAID use.    12/15/2017: Presents with 2 week history of persistent upper respiratory complaints.  Has been using DayQuil NyQuil and Mucinex with some relief.  Primarily frustrated by duration of illness.  Still has been working through this timeframe.  No described shortness of breath.  No fever chills.          Review of Systems   Constitutional:  No fever, No chills.    Eye:  Negative except as documented in history of present illness.    Ear/Nose/Mouth/Throat:  Nasal congestion.    Respiratory:  Cough, Sputum production, No shortness of breath.    Cardiovascular:  Chest pain, No palpitations, No peripheral edema, No syncope.    Gastrointestinal:  No nausea, No vomiting, No abdominal pain.    Genitourinary:  No dysuria, No hematuria.    Hematology/Lymphatics:  Negative except as documented in history of present illness.    Endocrine:  No excessive thirst, No polyuria.    Immunologic:  Malaise, No recurrent fevers.    Musculoskeletal:  No joint pain, No muscle pain, No trauma.    Neurologic:  Alert and oriented X4, Headache, No numbness, No tingling.       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  Comments:  10/29/2012 CDT 12:05 PM CDT - Amy Bearden  right eye superiorly - most likely seconardy to herpes zoster  Hiatal hernia (410792547)  IBS (Irritable Bowel Syndrome) (564.1)  Resolved  Epidermoid Cyst (706.2): Onset on 2012 at 47 years.  Resolved.  Herpes Zoster (053.9): Onset in the month of 2004 at 39 years  Resolved.  Comments:  2010 CST 5:54 PM CST - Yasmine Jaime  trigeminal nerve ophthalmic branch distribution  History of inguinal hernia repair ([id number]): Onset in  at 9 years.  Resolved.  Urticaria (708.9):  Resolved in  at 34 years.  HA (Headache) (784.0):  Resolved.   Family History:    Suicide  Father ()  Kidney stone  Brother  Deep vein thrombosis  Father ()     Procedure history:    Coronary artery calcium score (4705638269) on 2014 at 49 Years.  Comments:  2016 7:22 AM - [place] GISELL Beckie  [facility] Calcium score - 0  Vasectomy (63.73) in  at 39 Years.  Cholecystectomy (56274363) in the month of 1998 at 33 Years.  History of repair of inguinal hernia (2878364444) in  at 9 Years.   Social History:        Alcohol Assessment            Current, Beer (12 oz), Wine (5 oz), 3-5 times per week, 2 drinks/episode average.  3 drinks/episode maximum.      Tobacco Assessment            Never      Substance Abuse Assessment            Never      Employment and Education Assessment            Employed, Work/School description: .      Home and  Environment Assessment            Marital status: .      Exercise and Physical Activity Assessment            Exercise frequency: 1-2 times/week.        Physical Examination   vital signs stable, as noted above   Vital Signs   12/15/2017 2:56 PM CST Temperature Tympanic 97.4 DegF  LOW    Peripheral Pulse Rate 88 bpm    Pulse Site Radial artery    HR Method Electronic    Respiratory Rate 18 br/min    Systolic Blood Pressure 132 mmHg  HI    Diastolic Blood Pressure 82 mmHg  HI    Mean Arterial Pressure 99 mmHg    BP Site Right arm    BP Method Manual    Oxygen Saturation 95 %      Measurements from flowsheet : Measurements   12/15/2017 2:56 PM CST   Weight Measured - Standard                223 lb     General:  Alert and oriented, No acute distress.    HENT:  Tympanic membranes are clear, Oral mucosa is moist, No pharyngeal erythema, No sinus tenderness.    Neck:  Non-tender, No lymphadenopathy, No thyromegaly.    Respiratory:  Lungs are clear to auscultation, Respirations are non-labored, Breath sounds are equal.    Cardiovascular:  Normal rate, Regular rhythm, No murmur, No edema.    Genitourinary:  No costovertebral angle tenderness.    Musculoskeletal:  Normal range of motion, Normal strength.    Neurologic:  Alert, Oriented.    Cognition and Speech:  Oriented, Speech clear and coherent.    Psychiatric:  Appropriate mood & affect.       Review / Management   Results review:  Lab results: 11/6/2017 1:33 PM CST    Fecal Globin by [facility]                See comment  .       Impression and Plan   Diagnosis     HLD (Hyperlipidemia) (ANS37-QB E78.5).     Myalgia and myositis (NXU05-PH M79.1).         .) URI/sinusitis - persistent symptoms now for 2 weeks; overall, symptoms seem to be stable  - advised changing to Zyrtec - D for 7-10 day trial  - continue Mucinex  - provided Rx for azithromycin if symptoms not improving over next 72 hrs    plan as previously outlined:     .) HLPD   - low risk for future cardiac  events   - normal coronary calcium scoring (6/2014); taken off statin    .) health maintenance   - FIT cards for CRC screening; yet to complete.  If pains persist by time of seeing surgeon, could consider colonoscopy for diagnostic and screening purposes   - declines PSA screening   - PHQ9: 1   - monitor alcohol intake    RTC as previously scheduled

## 2022-02-16 NOTE — TELEPHONE ENCOUNTER
---------------------  From: Crow To LPN   To: RAUL Message Pool ([id number]_[place] [facility] [place]);     Sent: 4/14/2021 4:46:48 PM CDT  Subject: General Message     Phone message    PCP:   Bereket CRATER      Time of Call:  _ 445       Person Calling:  _ self  Phone number:  _ [phone]    Returned call at: _    Note:   _ Patient calling to update Beckie with his upcoming MRI schedule.  Requesting call back.    Last office visit and reason:  _

## 2022-02-16 NOTE — NURSING NOTE
Pt called stating he has an appt with [facility] tomorrow for px/muscle tightness in arms- states that it has gotten worse today and checked his bp today which was 159/99 but not sure if he is usig machine right- wondering if he needs to come in tonight. States that muscle tightness is in his biceps and axilla area and has been going on for two weeks but cause unknown- no heavy lifting of any sort recently. Denies SOB, chest pain, or any pain. Informed him that bp might be high d/t being tense and worried. Informed him that it will be fine to wait until tomorrow to see [facility] but to report to ED if any chest pain or SOB. Pt agreed with plan and verbalized understanding.

## 2022-02-16 NOTE — TELEPHONE ENCOUNTER
Order is entered in the [facility] interface and they will contact patient to schedule.  Patient informed.

## 2022-02-16 NOTE — NURSING NOTE
Quick Intake Entered On:  12/13/2019 8:51 AM CST    Performed On:  12/13/2019 8:51 AM CST by Sabine Jorgensen LPN               Summary   Chief Complaint :   CSS only BP check   Advance Directive :   No   Height Measured :   72 in(Converted to: 6 ft 0 in, 182.88 cm)    Systolic Blood Pressure :   128 mmHg   Diastolic Blood Pressure :   80 mmHg   Mean Arterial Pressure :   96 mmHg   BP Site :   Right arm   BP Method :   Manual   Race :      Languages :   English   Sabine Jorgensen LPN - 12/13/2019 8:51 AM CST

## 2022-02-16 NOTE — TELEPHONE ENCOUNTER
---------------------  From: Carmela Ortiz (Appointment Pool ([id number]_[place] [facility]))   To: CHESTER MEDELLIN    Sent: 2019 10:38:20 AM CST  Subject: RE: Appointment Request     Good morning Chester,    I have you scheduled for 19 at 4pm with Dr. Prudencio Olivo. Please let me know if this works.    thank you,    Carmela      ---------------------  From: CHESTER MEDELLIN  To: [facility]  Sent: 2019 10:15 a.m. CST  Subject: Appointment Request  Patient Name: CHESTER MEDELLIN  Patient : 1965  Preferred Provider: Dr Prudencio Olivo  Appointment Dates: First Available Appointment  Preferred Days: Monday,Tuesday,Wednesday,Thursday,Friday,Saturday  Preferred Time: any  Contact Patient by: Phone - [phone]    Reason for Visit:    annual physical and discuss recent tightness in both arms and armpits

## 2022-02-16 NOTE — LETTER
(Inserted Image. Unable to display)   February 18, 2021      CEHSTER MEDELLIN  [address], [place] [id number]        Dear CHESTER,      Thank you for selecting [facility] for your healthcare needs.      Here are results of recent lab tests for your review.  Regarding a cause of your neuropathy (numbness) symptoms, everything looked reassuring.  No evidence of thyroid disorder, B6 or B12 vitamin deficiency, or monoclonal protein (rarer causes of neuropathy)      Result Name Current Result Reference Range   Lab Report   2/12/2021    Cholesterol (mg/dL) ((H)) 231 2/12/2021  - <200   HDL (mg/dL)  76 2/12/2021 > OR = 40 -    Triglyceride (mg/dL)  104 2/12/2021  - <150   LDL ((H)) 134 2/12/2021    Cholesterol/HDL Ratio  3.0 2/12/2021  - <5.0   Non-HDL Cholesterol ((H)) 155 2/12/2021  - <130   Protein Total (gm/dL)  7.5 2/12/2021 6.1 - 8.1   Immunofixation  No monoclonal immunoglobulin detected. 2/12/2021    Albumin Level (gm/dL)  4.7 2/12/2021 3.8 - 4.8   Alpha 1 Globulin (gm/dL)  0.3 2/12/2021 0.2 - 0.3   Alpha 2 Globulin (gm/dL)  0.6 2/12/2021 0.5 - 0.9   Beta 1 Globulin (gm/dL)  0.4 2/12/2021 0.4 - 0.6   Beta 2 Globulin (gm/dL)  0.4 2/12/2021 0.2 - 0.5   Gamma Globulin (gm/dL)  1.2 2/12/2021 0.8 - 1.7   Protein Electrophoresis Interp  Protein electrophoresis pattern appears normal. 2/12/2021    Glucose Level (mg/dL)  94 2/12/2021 65 - 99   BUN (mg/dL)  18 2/12/2021 7 - 25   Creatinine Level (mg/dL)  0.98 2/12/2021 0.70 - 1.33   eGFR (mL/min/1.73m2)  86 2/12/2021 > OR = 60 -    eGFR  (mL/min/1.73m2)  99 2/12/2021 > OR = 60 -    BUN/Creat Ratio  NOT APPLICABLE 2/12/2021 6 - 22   Sodium Level (mmol/L)  140 2/12/2021 135 - 146   Potassium Level (mmol/L)  4.4 2/12/2021 3.5 - 5.3   Chloride Level (mmol/L)  101 2/12/2021 98 - 110   CO2 Level (mmol/L)  29 2/12/2021 20 - 32   Calcium Level (mg/dL)  10.1 2/12/2021 8.6 - 10.3   TSH  (mIU/L)  2.33 2/12/2021 0.40 - 4.50   Vitamin B12 Level (pg/mL)  410 2/12/2021 200 - 1,100   Methylmalonic Acid (nmol/L)  219 2/12/2021 87 - 318       Please contact me or my assistant at [phone] if you have any questions or concerns.     Sincerely,        Prudencio Olivo MD

## 2022-02-16 NOTE — PROGRESS NOTES
Patient:   CHESTER MEDELLIN            MRN: [id number]            FIN: [id number]               Age:   54 years     Sex:  Male     :  1965   Associated Diagnoses:   HLD (Hyperlipidemia); Physical exam   Author:   Prudencio Olivo MD      Visit Information      Date of Service: 2019 03:22 pm  Performing Location: [facility]  Encounter#: [id number]      Primary Care Provider (PCP):  Prudencio Olivo MD    NPI# [id number]      Referring Provider:  Prudencio Olivo MD    NPI# [id number]      Chief Complaint   2019 3:27 PM CST   Annual px, c/o tightness/ tingling in arms, pain and swelling in armpits, numbness in hands, lips are tingling, x 2 weeks            Additional Information:No additional information recorded during visit.   Chief complaint and symptoms as noted above and confirmed with patient      History of Present Illness   2019: Crow presents with approximately 10-day history of persistent pains symmetrically in both upper arms primarily in both the bicep and tricep region.  Also describes a fullness and swelling in both axilla.  No fever chills.  No malaise.  No shortness of breath.  Does describe bilateral paresthesias in both hands.  Also describes some perioral sensation.  Symptoms have been persistent.  No association with exertion.  No similar episodes in the past.  No describe rash.  No arthritis complaints.  No described neck pain.  No arm or  strength weakness.          Review of Systems   Constitutional:  No fever, No chills.    Eye:  Negative except as documented in history of present illness.    Ear/Nose/Mouth/Throat:  No nasal congestion.    Respiratory:  No shortness of breath, No cough, No sputum production.    Cardiovascular:  No chest pain, No palpitations, No peripheral edema, No syncope.    Gastrointestinal:  No nausea, No vomiting, No abdominal pain.    Genitourinary   Hematology/Lymphatics:  Swollen lymph glands.    Endocrine   Immunologic:  No malaise.     Musculoskeletal:  Muscle pain, No joint pain.    Neurologic:  Alert and oriented X4, Numbness, No tingling, No headache.       Health Status   Allergies:    Allergic Reactions (Selected)  No Known Medication Allergies   Medications:  (Selected)   ,    Medications          No Known Home Medications     Problem list:    All Problems  Corneal scarring / SNOMED CT 379639579 / Confirmed  Hiatal hernia / SNOMED CT 536932123 / Confirmed  IBS (irritable bowel syndrome) / SNOMED CT 14928339 / Confirmed  Obesity / SNOMED CT 1177184329 / Probable  Inactive: GERD (gastroesophageal reflux disease) / SNOMED CT 714293030  Resolved: Epidermoid cyst / SNOMED CT 6715209130  Resolved: HA (headache) / SNOMED CT 48669774  Resolved: Herpes zoster / SNOMED CT 9991480  Resolved: History of inguinal hernia repair / SNOMED CT [id number]  Resolved: Urticaria / SNOMED CT 53173242  Canceled: Hypothyroidism / ICD-9-.9      Histories   Past Medical History:    Active  Corneal scarring (404408336): Onset in  at 41 years.  Comments:  10/29/2012 CDT 12:05 PM CDT - Amy Bearden  right eye superiorly - most likely seconardy to herpes zoster  Hiatal hernia (863538514)  IBS (irritable bowel syndrome) (25869790)  Resolved  Epidermoid cyst (1243583566): Onset on 2012 at 47 years.  Resolved.  Herpes zoster (5247748): Onset in the month of 2004 at 39 years  Resolved.  Comments:  2010 CST 5:54 PM CST - Yasmine Jaime  trigeminal nerve ophthalmic branch distribution  History of inguinal hernia repair ([id number]): Onset in  at 9 years.  Resolved.  Urticaria (08091194):  Resolved in  at 34 years.  HA (headache) (88208004):  Resolved.   Family History:    Suicide  Father ()  Kidney stone  Brother  Deep vein thrombosis  Father ()     Procedure history:    Screening for colon cancer (5830795301) on 12/10/2018 at 53 Years.  Comments:  2018 3:50 PM CST -  Margarito BENNETT, Becky  [facility] result:  negative  Recommendation:  f/u 3yrs  Coronary artery calcium score (8955352577) on 6/27/2014 at 49 Years.  Comments:  2/9/2016 7:22 AM CST - Jesika JAMESONBeckiecollette Calcium score - 0  Vasectomy (63.73) in 2004 at 39 Years.  Cholecystectomy (20774741) in the month of 8/1998 at 33 Years.  History of repair of inguinal hernia (3963426762) in 1974 at 9 Years.   Social History:        Alcohol Assessment            Current, Beer (12 oz), Wine (5 oz), 3-5 times per week, 2 drinks/episode average.  3 drinks/episode maximum.      Tobacco Assessment            Never      Substance Abuse Assessment            Never      Employment and Education Assessment            Employed, Work/School description: .      Home and Environment Assessment            Marital status: .      Exercise and Physical Activity Assessment            Exercise frequency: 1-2 times/week.        Physical Examination   vital signs stable, as noted above   Vital Signs   11/22/2019 3:27 PM CST Temperature Tympanic 98.7 DegF    Peripheral Pulse Rate 92 bpm    Pulse Site Radial artery    HR Method Manual    Systolic Blood Pressure 168 mmHg  HI    Diastolic Blood Pressure 90 mmHg  HI    Mean Arterial Pressure 116 mmHg    BP Site Right arm    BP Method Manual      Measurements from flowsheet : Measurements   11/22/2019 3:27 PM CST Height Measured - Standard 72 in    Weight Measured - Standard 223.0 lb    BSA 2.26 m2    Body Mass Index 30.24 kg/m2  HI      General:  Alert and oriented, No acute distress.    Eye:  Pupils are equal, round and reactive to light, Normal conjunctiva.    HENT:  Tympanic membranes are clear, Oral mucosa is moist, No pharyngeal erythema.    Neck:  Supple, Non-tender, No lymphadenopathy, No thyromegaly.    Respiratory:  Lungs are clear to auscultation, Respirations are non-labored, Breath sounds are equal.    Cardiovascular:  Normal rate, Regular rhythm, No murmur, No  edema.    Gastrointestinal:  Soft, Non-tender.    Genitourinary:  No costovertebral angle tenderness.    Lymphatics:  No lymphadenopathy neck, axilla, groin.    Musculoskeletal:  Normal range of motion, Normal strength.    Neurologic:  Alert, Oriented.    Cognition and Speech:  Oriented, Speech clear and coherent.    Psychiatric:  Appropriate mood & affect.       Review / Management   Results review      Impression and Plan   Diagnosis     HLD (Hyperlipidemia) (VGS74-NW E78.5).     Physical exam (NEJ45-ZS Z00.00).       .) ~2 week h/o bilateral arm myalgias, paresthesia, questionable LAD?  - no appreciative lymphadenopathy or myositis on exam  - will check CBC, CPK, CRP, sed rate, BMP  - cervical stenosis features? - no reproducible symptoms with neck, spurling test   - would consider future MRI of neck if symptoms persist    .) elevated blood pressures - reactive vs. hypertension?  - reassess bp in 2-4 weeks    plan as previously outlined:     .) HLPD   - low risk for future cardiac events   - normal coronary calcium scoring (6/2014); taken off statin    - tentative plans to repeat in '19 to reassess    .) health maintenance   - FIT negative '17; will plan for Cologuard testing   - declines PSA screening   - PHQ9: 1   - monitor alcohol intake   - adult vaccinations updated   - h/o superficial thrombophlebitis    RTC in 1 month to reassess

## 2022-02-16 NOTE — PROGRESS NOTES
Patient:   CHESTER MEDELLIN            MRN: [id number]            FIN: [id number]               Age:   52 years     Sex:  Male     :  1965   Associated Diagnoses:   HLD (Hyperlipidemia); Myalgia and myositis; Chest pain   Author:   Prudencio Olivo MD      Visit Information      Date of Service: 2017 12:35 pm  Performing Location: [facility]  Encounter#: [id number]      Primary Care Provider (PCP):  Prudencio Olivo MD    NPI# [id number]      Referring Provider:  No referring provider recorded for selected visit.      Chief Complaint   3/9/2017 12:41 PM CST    ? umbilical hernia              Additional Information:No additional information recorded during visit.   Chief complaint and symptoms as noted above and confirmed with patient      History of Present Illness   3/14/2014:  Presents to clinic for annual evaluation.  Also feeling more stressed at work (), notices waking up in AM with clenched, sore jaw.  Wanting to get his labs done.  Thinks symptoms will improve with better stress management.  He's not too worried about this.  No significant PMHx.  No other voiced complaints.      2016: Presents to clinic with several week history of muscle tightness and soreness especially across his chest and upper arms.  No change in physical behaviors.  He can walk on a treadmill without any limitations.  He did have a normal coronary calcium score in  while he was maintained on statin therapy at that time.  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  Denies any significant reflux symptoms.  No recent illnesses or upper respiratory tract features.    3/9/2017: Presents to clinic with several month history of recurrent periumbilical bloating and discomfort.  Appears to have a prandial relationship.  No stool changes.  He has tried Prilosec on a few occasions it does seem to help with the pain.  No real epigastric component.  Denies  any more classic reflux symptoms.  He has noticed a periumbilical bulge for approximately the last year.  It does seem that his describe pains are in that general area.  He never has any sharp, strangulating type pains.  He has pursued for card testing for colorectal cancer screening.  No previous endoscopic history.  Denies any NSAID use.          Review of Systems   Constitutional:  No fever, No chills.    Eye:  Negative except as documented in history of present illness.    Ear/Nose/Mouth/Throat:  Negative except as documented in history of present illness.    Respiratory:  No shortness of breath.    Cardiovascular:  Chest pain, No palpitations, No peripheral edema, No syncope.    Gastrointestinal:  No nausea, No vomiting     Abdominal pain: Periumbilical.    Genitourinary:  No dysuria, No hematuria.    Hematology/Lymphatics:  Negative except as documented in history of present illness.    Endocrine:  No excessive thirst, No polyuria.    Immunologic:  No recurrent fevers.    Musculoskeletal:  No joint pain, No muscle pain, No trauma.    Neurologic:  Alert and oriented X4, No numbness, No tingling, No headache.       Health Status   Allergies:    Allergic Reactions (Selected)  No known allergies   Medications:  (Selected)      Problem list:    All Problems  Corneal Scarring / ICD-9-.00 / Confirmed  Hiatal hernia / SNOMED CT 329586109 / Confirmed  IBS (Irritable Bowel Syndrome) / ICD-9-.1 / Confirmed  Inactive: GERD (Gastroesophageal Reflux Disease) / ICD-9-.81  Resolved: Epidermoid Cyst / ICD-9-.2  Resolved: HA (Headache) / ICD-9-.0  Resolved: Herpes Zoster / ICD-9-.9  Resolved: History of inguinal hernia repair / SNOMED CT [id number]  Resolved: Urticaria / ICD-9-.9  Canceled: Hypothyroidism / ICD-9-.9      Histories   Past Medical History:    Active  Corneal Scarring (371.00): Onset in 2007 at 41 years.  Comments:  10/29/2012 CDT 12:05 PM  CDT - SuleimanAmy reynoso  right eye superiorly - most likely seconardy to herpes zoster  Hiatal hernia (272391822)  IBS (Irritable Bowel Syndrome) (564.1)  Resolved  Epidermoid Cyst (706.2): Onset on 2012 at 47 years.  Resolved.  Herpes Zoster (053.9): Onset in the month of 2004 at 39 years  Resolved.  Comments:  2010 CST 5:54 PM CST - Yasmine Jaime  trigeminal nerve ophthalmic branch distribution  History of inguinal hernia repair ([id number]): Onset in  at 9 years.  Resolved.  Urticaria (708.9):  Resolved in  at 34 years.  HA (Headache) (784.0):  Resolved.   Family History:    Suicide  Father ()  Kidney stone  Brother  Deep vein thrombosis  Father ()     Procedure history:    Coronary artery calcium score (9527569124) on 2014 at 49 Years.  Comments:  2016 7:22 AM [facility] Cal[facility] - 0  Vasectomy (63.73) in  at 39 Years.  Cholecystectomy (52648290) in the month of 1998 at 33 Years.  History of repair of inguinal hernia (3071227486) in  at 9 Years.   Social History:        Alcohol Assessment: Current            Current, Beer (12 oz), Wine (5 oz),  3-4x/week, 1 drinks/episode average.  3 drinks/episode maximum.      Tobacco Assessment            Never      Substance Abuse Assessment            Never      Employment and Education Assessment            Employed, Work/School description: .      Home and Environment Assessment            Marital status: .      Exercise and Physical Activity Assessment            Exercise frequency: 1-2 times/week.        Physical Examination   vital signs stable, as noted above   Vital Signs   3/9/2017 12:41 PM CST Temperature Tympanic 97.7 DegF  LOW    Peripheral Pulse Rate 60 bpm    Systolic Blood Pressure 140 mmHg    Diastolic Blood Pressure 78 mmHg    Mean Arterial Pressure 99 mmHg    BP Site Right arm      Measurements from flowsheet : Measurements   3/9/2017  12:41 PM CST    Weight Measured - Standard                214.4 lb     General:  Alert and oriented, No acute distress.    Respiratory:  Lungs are clear to auscultation, Respirations are non-labored, Breath sounds are equal.    Cardiovascular:  Normal rate, Regular rhythm, No murmur, No edema.    Gastrointestinal:  Soft, Non-tender, Non-distended, Normal bowel sounds, small periumbilical hernia, fairly tight; no obvious herniation with valsalva.    Genitourinary:  No costovertebral angle tenderness.    Musculoskeletal:  Normal range of motion, Normal strength, No tenderness.    Neurologic:  Alert, Oriented, Normal motor function, No focal deficits.    Cognition and Speech:  Oriented, Speech clear and coherent.    Psychiatric:  Appropriate mood & affect.       Review / Management   Results review      Impression and Plan   Diagnosis     HLD (Hyperlipidemia) (YLK55-KU E78.5).     Myalgia and myositis (CZR10-NR M79.1).     Chest pain (WWY44-YN R07.9).       .) periumbilical bloating/discomfort   - I doubt described symptoms are related to his periumbilical hernia.  Nonetheless, will have him see general surgery to assess   - suspect pains more related to functional bowel/IBS; may have some duodenitis/PUD component    - advised using omeprazole for at least 2 weeks and assess whether pains improved   - counseled on signs of strangulation symptoms from hernia and need to seek attention in those instances.    plan as previously outlined:     .) HLPD   - low risk for future cardiac events   - normal coronary calcium scoring (6/2014); taken off statin    .) health maintenance   - FIT cards for CRC screening; yet to complete.  If pains persist by time of seeing surgeon, could consider colonoscopy for diagnostic and screening purposes   - declines PSA screening   - PHQ9: 1   - monitor alcohol intake    RTC annually

## 2022-02-16 NOTE — NURSING NOTE
Comprehensive Intake Entered On:  9/1/2020 10:08 AM CDT    Performed On:  9/1/2020 10:01 AM CDT by Rosio Bajwa CMA               Summary   Chief Complaint :   Concerns with sinus congestion, pressure, ears ringing, chest congestion, nausea, low back pain last few days. Sx started to present about a month ago. Wife treated with abx last month for sinusitis - now better. Using Prilosec last 4 days.   Advance Directive :   No   Weight Measured :   215 lb(Converted to: 215 lb 0 oz, 97.52 kg)    Systolic Blood Pressure :   176 mmHg (HI)    Diastolic Blood Pressure :   110 mmHg (HI)    Mean Arterial Pressure :   132 mmHg   Peripheral Pulse Rate :   64 bpm   BP Site :   Right arm   Pulse Site :   Radial artery   BP Method :   Manual   HR Method :   Manual   Temperature Tympanic :   98.2 DegF(Converted to: 36.8 DegC)    Race :      Languages :   English   Rosio Bajwa CMA - 9/1/2020 10:01 AM CDT   Health Status   Allergies Verified? :   Yes   Medication History Verified? :   Yes   Pre-Visit Planning Status :   Not completed   Tobacco Use? :   Never smoker   Rosio Bajwa CMA - 9/1/2020 10:01 AM CDT   Meds / Allergies   (As Of: 9/1/2020 10:08:24 AM CDT)   Allergies (Active)   No Known Medication Allergies  Estimated Onset Date:   Unspecified ; Created By:   Beckie Canchola CMA; Reaction Status:   Active ; Category:   Drug ; Substance:   No Known Medication Allergies ; Type:   Allergy ; Updated By:   Beckie Canchola CMA; Reviewed Date:   11/22/2019 5:04 PM CST        Medication List   (As Of: 9/1/2020 10:08:24 AM CDT)   No Known Home Medications     Rosio Bajwa CMA - 9/1/2020 10:05:42 AM           ID Risk Screen   Recent Travel History :   No recent travel   Family Member Travel History :   No recent travel   Other Exposure to Infectious Disease :   Unknown   Rosio Bajwa CMA - 9/1/2020 10:01 AM CDT

## 2022-02-16 NOTE — NURSING NOTE
Quick Intake Entered On:  9/1/2020 11:06 AM CDT    Performed On:  9/1/2020 11:05 AM CDT by Rosio Bajwa CMA               Summary   Advance Directive :   No   Systolic Blood Pressure :   160 mmHg (HI)    Diastolic Blood Pressure :   104 mmHg (HI)    Mean Arterial Pressure :   123 mmHg   BP Site :   Right arm   BP Method :   Manual   Race :      Languages :   English   Rosio Bajwa CMA - 9/1/2020 11:05 AM CDT

## 2022-02-16 NOTE — LETTER
(Inserted Image. Unable to display)         March 11, 2021        CHESTER MEDELLIN  [address], [place] [id number]        Dear CHESTER,    Thank you for selecting [facility] for your healthcare needs.    Our records indicate you are due for the following services:     Annual Physical   Fasting Lab Tests ~ Please do not eat or drink anything 10 hours prior to your scheduled appointment time.  (Water and any medications that you may need are allowed unless directed otherwise.)     If you had your labs done at another facility or with Direct Access Lab Testing at [facility], please bring in a copy of the results to your next visit, mail a copy, or drop off a copy of your results to your Healthcare Provider.     (FYI   Regarding office visits: In some instances, a video visit or telephone visit may be offered as an option.)    To schedule an appointment or if you have further questions, please contact your clinic at [phone].    Powered by [employer] and [employer]    Sincerely,    Prudencio Olivo MD

## 2022-02-16 NOTE — PROGRESS NOTES
Patient:   CHESTER MEDELLIN            MRN: [id number]            FIN: [id number]               Age:   53 years     Sex:  Male     :  1965   Associated Diagnoses:   HLD (Hyperlipidemia); Physical exam   Author:   Prudencio Olivo MD      Visit Information      Date of Service: 2018 12:50 pm  Performing Location: [facility]  Encounter#: [id number]      Primary Care Provider (PCP):  Prudencio Olivo MD    NPI# [id number]      Referring Provider:  Prudencio Olivo MD    NPI# [id number]      Chief Complaint   2018 12:55 PM CST  Physical              Additional Information:No additional information recorded during visit.   Chief complaint and symptoms as noted above and confirmed with patient      History of Present Illness   3/14/2014:  Presents to clinic for annual evaluation.  Also feeling more stressed at work (), notices waking up in AM with clenched, sore jaw.  Wanting to get his labs done.  Thinks symptoms will improve with better stress management.  He's not too worried about this.  No significant PMHx.  No other voiced complaints.      2018: Returns for annual physical.  Seen in clinic this past month related to thrombophlebitis concerns.  Ultimately underwent CT PE protocol which was normal.  Feeling fine otherwise.  No specific complaints or concerns.          Review of Systems   Constitutional:  No fever, No chills.    Eye:  Negative except as documented in history of present illness.    Ear/Nose/Mouth/Throat:  No nasal congestion.    Respiratory:  No shortness of breath, No cough, No sputum production.    Cardiovascular:  No chest pain, No palpitations, No peripheral edema, No syncope.    Gastrointestinal:  No nausea, No vomiting, No abdominal pain.    Genitourinary   Hematology/Lymphatics:  Negative except as documented in history of present illness.    Endocrine   Immunologic:  No malaise.    Musculoskeletal:  No joint pain, No muscle pain.    Neurologic:  Alert  and oriented X4, No numbness, No tingling, No headache.       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  Comments:  10/29/2012 CDT 12:05 PM CDT - Amy Bearden  right eye superiorly - most likely seconardy to herpes zoster  Hiatal hernia (934816621)  IBS (Irritable Bowel Syndrome) (564.1)  Resolved  Epidermoid Cyst (706.2): Onset on 2012 at 47 years.  Resolved.  Herpes Zoster (053.9): Onset in the month of 2004 at 39 years  Resolved.  Comments:  2010 CST 5:54 PM CST - Yasmine Jaime  trigeminal nerve ophthalmic branch distribution  History of inguinal hernia repair ([id number]): Onset in  at 9 years.  Resolved.  Urticaria (708.9):  Resolved in  at 34 years.  HA (Headache) (784.0):  Resolved.   Family History:    Suicide  Father ()  Kidney stone  Brother  Deep vein thrombosis  Father ()     Procedure history:    Coronary artery calcium score (1580819822) on 2014 at 49 Years.  Comments:  2016 7:22 AM - Beckie Canchola CMA  [facility] Calcium score - 0  Vasectomy (63.73) in 2004 at 39 Years.  Cholecystectomy (81167908) in the month of 1998 at 33 Years.  History of repair of inguinal hernia  (0707942763) in 1974 at 9 Years.   Social History:        Alcohol Assessment            Current, Beer (12 oz), Wine (5 oz), 3-5 times per week, 2 drinks/episode average.  3 drinks/episode maximum.      Tobacco Assessment            Never      Substance Abuse Assessment            Never      Employment and Education Assessment            Employed, Work/School description: .      Home and Environment Assessment            Marital status: .      Exercise and Physical Activity Assessment            Exercise frequency: 1-2 times/week.        Physical Examination   vital signs stable, as noted above   Vital Signs   11/20/2018 12:55 PM CST Temperature Tympanic 97.9 DegF    Peripheral Pulse Rate 80 bpm    Systolic Blood Pressure 134 mmHg  HI    Diastolic Blood Pressure 80 mmHg    Mean Arterial Pressure 98 mmHg    BP Site Right arm      Measurements from flowsheet : Measurements   11/20/2018 12:55 PM CST Height Measured - Standard 72 in    Weight Measured - Standard 221.4 lb    BSA 2.26 m2    Body Mass Index 30.02 kg/m2  HI      General:  Alert and oriented, No acute distress.    HENT:  Tympanic membranes are clear, Oral mucosa is moist.    Neck:  Non-tender, No lymphadenopathy, No thyromegaly.    Respiratory:  Lungs are clear to auscultation, Respirations are non-labored, Breath sounds are equal.    Cardiovascular:  Normal rate, Regular rhythm, No murmur, No edema.    Gastrointestinal:  Soft, Non-tender.    Genitourinary:  No costovertebral angle tenderness.    Musculoskeletal:  Normal range of motion, Normal strength.    Neurologic:  Alert, Oriented.    Cognition and Speech:  Oriented, Speech clear and coherent.    Psychiatric:  Appropriate mood & affect.       Review / Management   Results review:  Lab results   11/1/2018 8:20 AM CDT Glucose Level 92 mg/dL    Cholesterol 246 mg/dL  HI    Non-  HI    HDL 63 mg/dL    Chol/HDL Ratio 3.9      HI    Triglyceride 170 mg/dL  HI   10/27/2018  10:10 AM CDT D-Dimer 1.63    D-Dimer Interpretation Abnormal   .       Impression and Plan   Diagnosis     HLD (Hyperlipidemia) (KQN48-CF E78.5).     Physical exam (QIG95-CX Z00.00).         .) HLPD   - low risk for future cardiac events   - normal coronary calcium scoring (6/2014); taken off statin    - tentative plans to repeat in '19 to reassess    .) health maintenance   - FIT negative '17; will plan for Cologuard testing   - declines PSA screening   - PHQ9: 1   - monitor alcohol intake   - adult vaccinations updated   - h/o superficial thrombophlebitis    RTC annually

## 2022-02-16 NOTE — LETTER
(Inserted Image. Unable to display)                                                                                                                                                        April 16, 2021Re: CHESTER MEDELLIN1965  [facility]   [address]  [address], [place] [address]Jd:   [facility] HospitalThe following patient has been referred to your office/practice:  CHESTER MEDELLIN Appointment:  Appointment is PendingPlease refer to the attached  clinical documentation for a summary of CHESTER's care.  Please do not hesitate to contact our office if any additional clinical questions arise.  All relevant records and transition of care documents should be mailed or faxed.Your assistance in providing continuity of care is appreciated. Sincerely, [address] [address](P) [phone](F) [phone]

## 2022-02-16 NOTE — NURSING NOTE
Depression Screening Entered On:  9/9/2020 12:55 PM CDT    Performed On:  9/9/2020 12:55 PM CDT by Beckie Canchola CMA               Depression Screening   Little Interest - Pleasure in Activities :   Not at all   Feeling Down, Depressed, Hopeless :   Not at all   Initial Depression Screen Score :   0 Score   Poor Appetite or Overeating :   Not at all   Trouble Falling or Staying Asleep :   Not at all   Feeling Tired or Little Energy :   Not at all   Feeling Bad About Yourself :   Not at all   Trouble Concentrating :   Not at all   Moving or Speaking Slowly :   Not at all   Thoughts Better Off Dead or Hurting Self :   Not at all   NADIRA Difficulty with Work, Home, Others :   Not difficult at all   Detailed Depression Screen Score :   0    Total Depression Screen Score :   0    Beckie Canchola CMA - 9/9/2020 12:55 PM CDT

## 2022-02-16 NOTE — LETTER
(Inserted Image. Unable to display)         December 26, 2019        CHESTER MEDELLIN  [address], [place] [id number]        Dear CHESTER,    Thank you for selecting [facility] for your healthcare needs.    Our records indicate you are due for the following services:     Follow-up office visit      To schedule an appointment or if you have further questions, please contact your primary clinic:   [facility]       [phone]   [facility]       [phone]              [facility]     [phone]      Powered by [employer]    Sincerely,    Prudencio Olivo MD

## 2022-02-16 NOTE — TELEPHONE ENCOUNTER
---------------------  From: Prudencio Olivo MD   To: CHESTER MEDELLIN    Sent: 12/1/2019 8:33:40 PM CST  Subject: Patient Message - Results Notification         Labs for your review.  We can discuss in more detail at future clinic visit.     Results:  Date Result Name Ind Value Ref Range   11/22/2019 4:11 PM Sodium Level  140 mmol/L (135 - 146)   11/22/2019 4:11 PM Potassium Level  4.8 mmol/L (3.5 - 5.3)   11/22/2019 4:11 PM Chloride Level  100 mmol/L (98 - 110)   11/22/2019 4:11 PM CO2 Level  28 mmol/L (20 - 32)   11/22/2019 4:11 PM Glucose Level ((H)) 104 mg/dL (65 - 99)   11/22/2019 4:11 PM BUN  20 mg/dL (7 - 25)   11/22/2019 4:11 PM Creatinine Level  1.04 mg/dL (0.70 - 1.33)   11/22/2019 4:11 PM BUN/Creat Ratio  NOT APPLICABLE (6 - 22)   11/22/2019 4:11 PM eGFR  81 mL/min/1.73m2 (> OR = 60 - )   11/22/2019 4:11 PM eGFR African American  94 mL/min/1.73m2 (> OR = 60 - )   11/22/2019 4:11 PM Calcium Level  10.2 mg/dL (8.6 - 10.3)   11/22/2019 4:11 PM C-Reactive Protein (CRP)  2.9 mg/L ( - <8.0)   11/22/2019 4:11 PM Total CK  183 unit/L (44 - 196)   11/22/2019 4:11 PM Cholesterol ((H)) 267 mg/dL ( - <200)   11/22/2019 4:11 PM Non-HDL Cholesterol ((H)) 205 ( - <130)   11/22/2019 4:11 PM HDL  62 mg/dL (>40 - )   11/22/2019 4:11 PM Cholesterol/HDL Ratio  4.3 ( - <5.0)   11/22/2019 4:11 PM LDL ((H)) 177    11/22/2019 4:11 PM Triglyceride  141 mg/dL ( - <150)   11/22/2019 4:11 PM   4 unit/mL ( - <35)   11/22/2019 4:11 PM WBC  4.7 (3.8 - 10.8)   11/22/2019 4:11 PM RBC  5.02 (4.20 - 5.80)   11/22/2019 4:11 PM Hgb  16.4 gm/dL (13.2 - 17.1)   11/22/2019 4:11 PM Hct  46.3 % (38.5 - 50.0)   11/22/2019 4:11 PM MCV  92.2 fL (80.0 - 100.0)   11/22/2019 4:11 PM MCH  32.7 pg (27.0 - 33.0)   11/22/2019 4:11 PM MCHC  35.4 gm/dL (32.0 - 36.0)   11/22/2019 4:11 PM RDW  12.4 % (11.0 - 15.0)   11/22/2019 4:11 PM Platelet  273 (140 - 400)   11/22/2019 4:11 PM MPV  10.3 fL (7.5 - 12.5)   11/22/2019 4:11 PM Lymphocytes  19.9 %     11/22/2019 4:11 PM Abs Lymphocytes  935 (850 - 3,900)   11/22/2019 4:11 PM Neutrophils  69.8 %    11/22/2019 4:11 PM Abs Neutrophils  3,281 (1,500 - 7,800)   11/22/2019 4:11 PM Monocytes  9.3 %    11/22/2019 4:11 PM Abs Monocytes  437 (200 - 950)   11/22/2019 4:11 PM Eosinophils  0.4 %    11/22/2019 4:11 PM Abs Eosinophils  19 (15 - 500)   11/22/2019 4:11 PM Basophils  0.6 %    11/22/2019 4:11 PM Abs Basophils  28 (0 - 200)   11/22/2019 4:11 PM Sed Rate  17 ( - < OR = 20)   11/22/2019 4:11 PM Test in Question - Test(s) Ordered   C-REACTIVE PROTEIN    11/22/2019 4:11 PM Misc Test CPT Code   4420SB    11/22/2019 4:11 PM Misc Test Client Contact  TAMMIE JANG    11/22/2019 4:11 PM Misc Test Comment  See comment    11/22/2019 4:11 PM Misc Test Comment  See comment

## 2022-02-16 NOTE — TELEPHONE ENCOUNTER
---------------------  From: Nannette Corea (Referral Coordinators Pool ([id number]_[facility]))   To: [facility] Message Pool ([id number]_[place] [facility] [place]);     Sent: 4/27/2021 9:41:08 AM CDT  Subject: RE: General Message     Noted.    ---------------------  From: Beckie Canchola CMA ([facility] Message Pool ([id number]_[facility]))   To: Referral Coordinators Pool ([id number]_[facility]);     Sent: 4/27/2021 9:23:18 AM CDT  Subject: FW: General Message     pt has appt with Neuro 5/6-please make sure copy of MRI gets to Nadiya      ---------------------  From: Prudencio Olivo MD   To: [facility] Message Pool ([id number]_[place] [facility] [place]);     Sent: 4/24/2021 3:57:59 PM CDT  Subject: General Message     Can you check in with Crow to see where he is with neuropathy work-up.  If following with neurology, can take their lead on further work-up.  If hasn't seen neurology, can you have him arrange for follow-up visit with TFS in my absence in interpret MRI with him

## 2022-02-16 NOTE — TELEPHONE ENCOUNTER
---------------------  From: Beckie Canchola CMA ([facility] Message Pool ([id number]_[facility]))   To: Zeferino Angeles MD;     Sent: 4/27/2021 8:15:11 AM CDT  Subject: FW: General Message     could you review and weigh in on your thoughts.  Crow has appt with Nadiya 5/6, he's okay waiting, but would like to hear your thoughts      ---------------------  From: Prudencio Olivo MD   To: [facility] Message Pool ([id number]_[place] [facility] [place]);     Sent: 4/24/2021 3:57:59 PM CDT  Subject: General Message     Can you check in with Crow to see where he is with neuropathy work-up.  If following with neurology, can take their lead on further work-up.  If hasn't seen neurology, can you have him arrange for follow-up visit with TFS in my absence in interpret MRI with himI did contact  pt and advised to keep appt with Neuro.  Per pt not having any further numbness/tingling in legs-both arms.  Will make sure copy of MRI gets to Nadiya.

## 2022-02-16 NOTE — PROGRESS NOTES
Chief Complaint    c/o chest cold for the past month, also back of left hamstring feeling tight has history of DVT  History of Present Illness      Chief complaint as above reviewed and confirmed with patient.  Pt presents to the clinic with concerns re:persistent cough and some congestion in the chest.  He states about 1 month ago had ST, mild rhinorrhea, congestion.  It went to his chest and has had a cough since.  All of the other sx improved and his cough is lingering but mild.  no sob or difficulty breathing. no chest pain.  He states he would not normally be concerned but had a large superficial thrombophlebitis 6 months ago. was on Xarlato during that time for 45 days.  It was rechecked and essentially resolved.  He has no hx of coagulopathy.  has not noted any redness, swelling.  feels some tightness in the hamstring area but no pain.  no tingling or numnbess. HIs concern is that the cough represents PE.  He has no had immobilization. no hx of cancer. no trauma.  He states that if he had not had the thrombophlebitis he would not have been concerned about the cough.  no hemoptysis.  Review of Systems      Review of systems is negative with the exception of those noted in HPI          Physical Exam   Vitals & Measurements    T: 98   F (Tympanic)  HR: 94(Peripheral)  BP: 138/88  SpO2: 97%     HT: 72.5 in  WT: 216.2 lb  BMI: 28.92           Vitals as above per nursing documentation           Constitutional : nad appears well          Ears: ears patent B, TMS intact, noninjected           Nose: nasal mucosa is non-ededmatous. no discharge           Throat: pharynx is nonerythematous, no tonsillar hypertrophy, no exudate           Neck: neck supple, no adenopathy, no thyromegaly, no rigidity           Lungs: lungs CTA', no Wheezes, rhonchi or rales           Heart: heart RRR, nl S1, S2 no murmur           skin:  No rashes            leg: no redness, swelling, warmth or palpable cord.  Ingrid's test negative.  SLR  is negative.      CXR: unremarkable per my independent evaluation.       D-dimer elevated       CT PEprotocol of the chest: unremarkable.  no PE  Assessment/Plan       Cough (R05)         reassured pt this likely represpents post viral cough. recommend observation. push fluids, OTC meds as needed for sx.  if any worsening or not resolveing fu with pcp.  he is in need of well male exam, will schedule with his pcp.         Orders:          CT Chest for PE w/ Contrast (Request), Priority: STAT, Instructions: IV CONTRAST, Cough  Hx of thrombophlebitis          XR Chest PA/Lat (Request), Cough                Hx of thrombophlebitis (Z86.72)         Orders:          CT Chest for PE w/ Contrast (Request), Priority: STAT, Instructions: IV CONTRAST, Cough  Hx of thrombophlebitis          D-Dimer (Request), Priority: Urgent, Hx of thrombophlebitis           Patient Information     Name:CHESTER MEDELLIN      Address:      [address] [place], [place] [address]     Sex:Male     YOB: 1965     [phone]     Emergency Contact:JAZMYN MEDELLIN     MRN:[id number]     FIN:[id number]     Location:[facility]     Date of Service:10/27/2018      Primary Care Physician:       Prudencio Olivo MD, [phone]      Attending Physician:       Milady Parker PA-C, [phone]  Problem List/Past Medical History    Ongoing     Corneal Scarring       Comments: right eye superiorly - most likely seconardy to herpes zoster     GERD (Gastroesophageal Reflux Disease)     Hiatal hernia     IBS (Irritable Bowel Syndrome)    Historical     Epidermoid Cyst     HA (Headache)     Herpes Zoster       Comments: trigeminal nerve ophthalmic branch distribution     History of inguinal hernia repair     Urticaria  Procedure/Surgical History     Coronary artery calcium score (06/27/2014)            Comments:      [facility] Calcium score - 0     Vasectomy (2004)           Cholecystectomy (08/1998)            History of repair of inguinal hernia (1974)        Medications     No Recorded Medications      Allergies    No known allergies  Social History    Smoking Status - 10/27/2018     Former smoker     Alcohol      Current, Beer (12 oz), Wine (5 oz), 3-5 times per week, 2 drinks/episode average. 3 drinks/episode maximum., 03/09/2017     Employment and Education      Employed, Work/School description: ., 02/22/2012     Exercise and Physical Activity      Exercise frequency: 1-2 times/week., 11/08/2010     Home and Environment      Marital status: ., 02/22/2012     Substance Abuse      Never, 11/08/2010     Tobacco      Never, 02/06/2012  Family History    Deep vein thrombosis: Father.    Kidney stone: Brother.    Suicide: Father.  Immunizations      Vaccine Date Status      tetanus/diphth/pertuss (Tdap) adult/adol 12/18/2007 Recorded  Lab Results       Lab Results (Last 4 results within 90 days)        D-Dimer: 1.63 (10/27/18 10:10:00 CDT)       D-Dimer Interp: Abnormal (10/27/18 10:10:00 CDT)

## 2022-02-16 NOTE — PROGRESS NOTES
Chief Complaint    Concerns with sinus congestion, pressure, ears ringing, chest congestion, nausea, low back pain last few days. Sx started to present about a month ago. Wife treated with abx last month for sinusitis - now better. Using Prilosec last 4 days.  History of Present Illness      Patient is a 55-year-old male who comes in with concerns about a right-sided headache.  It started about 2 weeks ago as a throbbing kind of twitch pain just on the right side of his head.  He points at his temple area.  And now the last 2 days it has been a constant dull ache.  The headache is not so bad that it is bothering him with his focus or thinking.  Is just a annoying dull ache on the right side of his head.  Last night he finally broke down and took some Advil 600 mg and that seemed to help and he slept fine.  The headache is back again today however.               About a month ago he had a sore throat and chest congestion and head congestion with occasional cough.  That has mostly cleared up and then this right-sided dull headache appeared.  He still has some congestion.  No cough except for in the morning.  No sore throat.  No sinus pain.  No fever.  He does have some tooth pain.       He has had high blood pressure readings before and he lost about 25 pounds and saw his blood pressure readings improve.  He is frustrated right now because he has gained some of that weight back.       Last week they dropped their youngest daughter off at college.  His middle daughter has a wedding next month.       He also has some dull low back pain bilaterally.  He denies any urinary symptoms including no hematuria, dysuria, urinary frequency or urgency.  No abdominal pain.  He has had some nausea lately that seems to be worse after eating.  This is happened to him before and he was advised to take Prilosec for 10 days.  He started taking Prilosec 4 days ago.  Review of Systems      Negative except as listed in HPI.  Physical Exam    Vitals & Measurements    T: 98.2  F (Tympanic)  HR: 64 (Peripheral)  BP: 160/104     WT: 215 lb       Vitals noted and blood pressure is elevated today at 176/110.      Recheck blood pressure: 160/104       In general he is alert, oriented, and in no acute distress.        Eyes: EOMI, PERRLA, conjunctive a noninjected.        Ears: Right canal patent, TM intact, no erythema no bulging.        Left ear canal partially blocked by cerumen but visualized aspects of the TM are within normal limits with no erythema.        Nasal mucosa is mildly swollen bilaterally.        Mouth mucous membranes are pink and moist.  Pharynx is not erythematous.        Neck is supple with no cervical lymphadenopathy.        Heart has a regular rate and rhythm with no murmurs.        Lungs are clear to auscultation bilaterally with no wheezes rales or rhonchi.        Back has no tenderness to palpation of the lumbar spine or bilateral lumbar paraspinal muscles.        Radial pulses are normal and equal bilaterally.        There is no pedal edema.        CBC has a low white blood count at 4.0 and is otherwise normal        BMP: within normal limits except elevated glucose to 115.  he reports only coffee with cream so far today        Sed rate: normal at 15  Assessment/Plan       Elevated blood pressure reading without diagnosis of hypertension (R03.0)        check BP at home several times over the next week and bring in readings for Dr John to review        resume walking         Ordered:          Basic Metabolic Panel (Request), Priority: Urgent, Elevated blood pressure reading without diagnosis of hypertension  Headache          C-Reactive Protein* ([facility]), Specimen Type: Serum, Collection Date: 09/01/20 10:30:00 CDT          CBC w/o Diff (Request), Priority: Urgent, Elevated blood pressure reading without diagnosis of hypertension  Headache          Return to Clinic (Request), RFV: see Dr Olivo for recheck, Return in 1 week           Sedimentation Rate, Westergren (Request), Priority: Urgent, Elevated blood pressure reading without diagnosis of hypertension  Headache                Elevated glucose (R73.09)         consider checking HgbA1c to look for type 2 DM or prediabetes         Ordered:          Return to Clinic (Request), RFV: see Dr Olivo for recheck, Return in 1 week                Headache (R51)        advised taking acetaminophen and ibuprofen prn        increase fluid intake        rest         Ordered:          Basic Metabolic Panel (Request), Priority: Urgent, Elevated blood pressure reading without diagnosis of hypertension  Headache          C-Reactive Protein* ([facility]), Specimen Type: Serum, Collection Date: 09/01/20 10:30:00 CDT          CBC w/o Diff (Request), Priority: Urgent, Elevated blood pressure reading without diagnosis of hypertension  Headache          Return to Clinic (Request), RFV: see Dr Olivo for recheck, Return in 1 week          Sedimentation Rate, Westergren (Request), Priority: Urgent, Elevated blood pressure reading without diagnosis of hypertension  Headache                Leukopenia (D72.819)         recheck at follow up visit with Dr Olivo        likely viral illness         Ordered:          Return to Clinic (Request), RFV: see Dr Olivo for recheck, Return in 1 week           Patient Information     Name:CHESTER MEDELLIN BETH      Address:      [address] [place] [phone]     Sex:Male     YOB: 1965     [phone]     Emergency Contact:JAZMYN MEDELLIN     MRN:[id number]     FIN:[id number]     Location:[facility]     Date of Service:09/01/2020      Primary Care Physician:       Prudencio Olivo MD, [phone]      Attending Physician:       Cheyenne Morgan MD, [phone]  Problem List/Past Medical History    Ongoing     Corneal scarring       Comments: right eye superiorly - most likely seconardy to herpes zoster     GERD  (gastroesophageal reflux disease)     Hiatal hernia     IBS (irritable bowel syndrome)     Obesity    Historical     Epidermoid cyst     HA (headache)     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     Coronary artery calcium score (06/27/2014)      Comments: Agatston Calcium score - 0.     Vasectomy (2004)     Cholecystectomy (08/1998)     History of repair of inguinal hernia (1974)  Medications   No active medications  Allergies    No Known Medication Allergies  Social History    Smoking Status     Never smoker     Alcohol      Current, Beer (12 oz), Wine (5 oz), 3-5 times per week, 2 drinks/episode average. 3 drinks/episode maximum.     Employment/School      Employed, Work/School description: .     Exercise      Exercise frequency: 1-2 times/week.     Home/Environment      Marital status: .     Substance Abuse      Never     Tobacco      Never  Family History    Deep vein thrombosis: Father.    Kidney stone: Brother.    Suicide: Father.  Immunizations      Vaccine Date Status          tetanus/diphth/pertuss (Tdap) adult/adol 12/18/2007 Recorded  Lab Results       Lab Results (Last 4 results within 90 days)        Sodium Level: 141 [135 mmol/L - 145 mmol/L] (09/01/20 10:43:00)       Potassium Level: 4.1 [3.5 mmol/L - 5 mmol/L] (09/01/20 10:43:00)       Chloride Level: 105 [98 mmol/L - 110 mmol/L] (09/01/20 10:43:00)       CO2 Level: 27 [21 mmol/L - 31 mmol/L] (09/01/20 10:43:00)       AGAP: 9 [5  - 18] (09/01/20 10:43:00)       Glucose Level: 115 High [65 mg/dL - 100 mg/dL] (09/01/20 10:43:00)       BUN: 18 [8 mg/dL - 25 mg/dL] (09/01/20 10:43:00)       Creatinine Level: 0.99 [0.72 mg/dL - 1.25 mg/dL] (09/01/20 10:43:00)       BUN/Creat Ratio: 18 [10  - 20]  (09/01/20 10:43:00)       eGFR : >60 (09/01/20 10:43:00)       eGFR Non-: >60 (09/01/20 10:43:00)       Calcium Level: 9.4 [8.5 mg/dL - 10.5 mg/dL] (09/01/20 10:43:00)       WBC: 4.0 Low [4.5  - 11] (09/01/20 10:43:00)       RBC: 4.88 [4.3  - 5.9] (09/01/20 10:43:00)       Hgb: 16.2 [13.5 g/dL - 17.5 g/dL] (09/01/20 10:43:00)       Hct: 45.7 [37 % - 53 %] (09/01/20 10:43:00)       MCV: 94 [80 fL - 100 fL] (09/01/20 10:43:00)       MCH: 33.2 [26 pg - 34 pg] (09/01/20 10:43:00)       MCHC: 35.4 [32 g/dL - 36 g/dL] (09/01/20 10:43:00)       RDW: 12.5 [11.5 % - 15.5 %] (09/01/20 10:43:00)       Platelet: 214 [140  - 440] (09/01/20 10:43:00)       MPV: 9.8 [6.5 fL - 11 fL] (09/01/20 10:43:00)       Sed Rate: 15 (09/01/20 10:43:00)

## 2022-02-16 NOTE — TELEPHONE ENCOUNTER
---------------------  From: Beckie Canchola CMA ([employer] Message Pool ([id number]_[facility]))   To: Prudencio Olivo MD;     Sent: 2021 12:45:36 PM CDT  Subject: FW: Appointment Request     .) sub-acute (2 month) h/o paresthesias, symmetric involvement through forearms/hands (all fingers); aggravated at night with particular positioning   - seen in clinic on  with same complaints - focus on cervical origin (had fallen while walking dog) - no symptom improvement on prednisone    - no associated weakness, no neck pains    - possibly some CTS features, though symmetry and foot involvement argues against    - mild numbness in feet as well   - non-diabetic   - will check B12, B6, TSH, MMA, SPEP   - if persists, would have him see neurology for EMG, reassessment      ---------------------  From: Maria Elena Granados (Appointment Pool ([id number]))   To: [facility] Message Pool ([id number]_[place] [employer] [place]);     Sent: 2021 12:26:38 PM CDT  Subject: FW: Appointment Request     There is no referral in the chart.    Maria Elena      ---------------------  From: CHESTER MEDELLIN  To: [facility]  Sent: 2021 12:18 p.m. CDT  Subject: Appointment Request  Patient Name: CHESTER MEDELLIN  Patient : 1965  Preferred Provider: Dr Prudencio Olivo neurology referral  Appointment Dates: First Available Appointment  Preferred Days: Any day  Preferred Time:   Contact Patient by: Phone - [phone]    Reason for Visit:    follow up to 21 notes from Dr. Olivo recommending consultation with neurology  as the tingling in my hands / arms have not gone away.---------------------  From: Prudencio Olivo MD   To: [employer] ([id number]_[place] [employer] [place]);     Sent: 2021 1:00:27 PM CDT  Subject: RE: Appointment Request     I would advise neurology referral as previously discussed.  I would also have him go ahead and complete MRI of cervical spine;  Dx: upper and lower extremity paresthesia, cervical stenosis  ** Submitted:  **  Order:Referral (Request)  Details:  4/14/2021 4:57 PM CDT, Referred to: Neurology, Stenosis, cervical spine  Paresthesia of upper and lower extremity         Signed by Beckie Canchola CMA  4/14/2021 9:57:00 PM [facility]    ** Submitted: **  Order:MRI Cervical Spine w/o Contrast (Request)  Details:  Paresthesia of upper and lower extremity  Stenosis, cervical spine         Signed by Beckie Canchola CMA  4/14/2021 9:57:00 PM [facility]

## 2022-02-16 NOTE — NURSING NOTE
Quick Intake Entered On:  12/26/2019 1:25 PM CST    Performed On:  12/26/2019 1:24 PM CST by Mendy Del Valle               Summary   Advance Directive :   No   Weight Measured :   209.8 lb(Converted to: 209 lb 13 oz, 95.16 kg)    Height Measured :   72 in(Converted to: 6 ft 0 in, 182.88 cm)    Body Mass Index :   28.45 kg/m2 (HI)    Body Surface Area :   2.2 m2   Systolic Blood Pressure :   138 mmHg (HI)    Diastolic Blood Pressure :   84 mmHg (HI)    Mean Arterial Pressure :   102 mmHg   BP Site :   Right arm   BP Method :   Manual   Race :      Languages :   English   Mendy Del Valle - 12/26/2019 1:24 PM CST   More Vitals   Systolic Blood Pressure Repeat :   132 mmHg   Diastolic Blood Pressure Repeat :   78 mmHg   BP Site Repeat :   Right arm   Mendy Del Valle - 12/26/2019 1:24 PM CST

## 2022-02-16 NOTE — NURSING NOTE
Comprehensive Intake Entered On:  2/12/2021 4:50 PM CST    Performed On:  2/12/2021 4:47 PM CST by Beckie Canchola CMA               Summary   Chief Complaint :   1.  f/u parestesia in BL arsm- noticed more sx after the hard fall a few wks ago, ongoing lethargy.  2.  review coronary calcium score from today  3.  ST x today   Advance Directive :   No   Weight Measured :   204.4 lb(Converted to: 204 lb 6 oz, 92.714 kg)    Height Measured :   72 in(Converted to: 6 ft 0 in, 182.88 cm)    Body Mass Index :   27.72 kg/m2 (HI)    Body Surface Area :   2.17 m2   Systolic Blood Pressure :   136 mmHg (HI)    Diastolic Blood Pressure :   80 mmHg   Mean Arterial Pressure :   99 mmHg   Peripheral Pulse Rate :   88 bpm   Temperature Tympanic :   99.2 DegF(Converted to: 37.3 DegC)    Oxygen Saturation :   96 %   Race :      Languages :   English   Beckie Canchola CMA - 2/12/2021 4:47 PM CST   Health Status   Allergies Verified? :   Yes   Medication History Verified? :   Yes   Medical History Verified? :   Yes   Pre-Visit Planning Status :   Completed   Tobacco Use? :   Never smoker   Beckie Canchola CMA - 2/12/2021 4:47 PM CST   ID Risk Screen   Recent Travel History :   Last travel within 14 days   Family Member Travel History :   Last travel within 14 days   Other Exposure to Infectious Disease :   Unknown   COVID-19 Testing Status :   No positive COVID-19 test   Beckie Canchola CMA - 2/12/2021 4:47 PM CST   Social History   Social History   (As Of: 2/12/2021 4:50:59 PM CST)   Alcohol:        Current, Beer (12 oz), Wine (5 oz), 3-5 times per week, 2 drinks/episode average.  3 drinks/episode maximum.   (Last Updated: 3/9/2017 2:23:21 PM CST by Adwoa Esqueda)          Tobacco:        Never (less than 100 in lifetime)   (Last Updated: 1/25/2021 2:07:32 PM CST by Becky Pimentel MA)          Electronic Cigarette/Vaping:        Electronic Cigarette Use: Never.   (Last Updated: 1/25/2021 2:07:36 PM CST by Becky Pimentel MA)           Substance Abuse:        Never   (Last Updated: 11/8/2010 5:56:17 PM CST by Yasmine Jaime)          Employment/School:        Employed, Work/School description: .   (Last Updated: 2/22/2012 11:29:03 AM CST by Alexandra Antunez CMA)          Home/Environment:        Marital status: .   (Last Updated: 2/22/2012 11:29:17 AM CST by Alexandra Antunez CMA)          Exercise:        Exercise frequency: 1-2 times/week.   (Last Updated: 11/8/2010 5:51:52 PM CST by Yasmine Jaime)

## 2022-02-16 NOTE — TELEPHONE ENCOUNTER
---------------------  From: Prudencio Olivo MD   To: [employer] Message Pool ([id number] CHESTER MEDELLIN    Sent: 2/18/2021 1:01:34 PM CST  Subject: General Message     Please reach to [facility] and share that labs investigating into peripheral neuropathy cause, all look normal and reassuring.  Nothing to suggest B vitamin deficiency, thyroid disorder, or blood disorder that would explain symptoms.  Could be carpal tunnel, though seems unusual to see symmetric symptoms.  I would recommend we have him meet with neurology for further assessment, who may recommend nerve conduction studies and/or neuro-imaging to better evaluation.  If he prefers to hold off for now, and monitor symptoms a bit further, then I'm okay with that too.pt contacted at 1326 and reviewed  will continue to monitor symptoms for now

## 2022-02-16 NOTE — TELEPHONE ENCOUNTER
---------------------  From: Cheyenne Morgan MD   To: CHESTER MEDELLIN    Sent: 9/2/2020 5:21:05 PM CDT  Subject: General Message         Crow,    The second inflammatory marker also came back in the normal range.    Please follow up next week with Dr Olivo as we had discussed.  I hope that blood pressure is coming down!    Cheyenne Morgan MD      Results:  Date Result Name Value Ref Range   9/1/2020 10:36 AM C-Reactive Protein (CRP) 1.5 mg/L ( - <8.0)

## 2022-02-16 NOTE — NURSING NOTE
Quick Intake Entered On:  9/9/2020 8:12 AM CDT    Performed On:  9/9/2020 8:12 AM CDT by Beckie Canchola CMA               Summary   Advance Directive :   No   Height Measured :   72 in(Converted to: 6 ft 0 in, 182.88 cm)    Systolic Blood Pressure :   152 mmHg (HI)    Diastolic Blood Pressure :   80 mmHg   Mean Arterial Pressure :   104 mmHg   Race :      Languages :   English   Beckie Canchola CMA - 9/9/2020 8:12 AM CDT

## 2022-02-16 NOTE — RESULTS
-------------------------------- Original Report -------------------------------    EXAM:  MR CERVICAL SPINE WITHOUT CONTRAST 3T    CLINICAL INFORMATION:  56 years old Male with tingling in both arms and hands,  tightness in the biceps, upper back and neck since December 2020 following a  fall.    TECHNICAL INFORMATION:  Sagittal T2, T2 fat-saturated, T1, 3-D T2 space with  oblique foraminal reformations, axial T2 and GRE sequences.      Contrast: None.   Sedation: None.    COMPARISON/CORRELATION:  None.     INTERPRETATION:      Spinal Cord: Normal signal.  Alignment: Normal cervical lordosis.    T2-3: No disc herniation, central or foraminal stenosis.  Normal facet  morphology.    T1-2: Ventral spondylosis with normal dorsal disc contour and no stenosis or  impingement.    C7-T1: Mild spondylosis, annular bulge without central stenosis or cord  impingement.  Mild left facet degeneration.  Mild foraminal narrowing.    C6-7: Moderate disc degeneration, endplate ridging and no central stenosis or  cord impingement.  Moderate foraminal stenosis bilaterally.    C5-6: Moderate disc degeneration, annular bulge/endplate ridging without central  stenosis or cord impingement.  Moderately severe right, moderate left foraminal  stenosis.    C4-5: Mild spondylosis without central stenosis or cord impingement.   Uncovertebral spurring with minimal foraminal narrowing.  Mild right greater  than left facet hypertrophy.    C3-4: Mild spondylosis without central stenosis or cord impingement.  Minimal  foraminal narrowing.  Mild right greater than left facet hypertrophy.    C2-3: Mild spondylosis without central stenosis or cord impingement.  Foramina  are patent.  Mild left facet degeneration.    Osseous Structures: No acute fracture or osseous destructive lesion.  Marrow  signal is within normal limits.      Soft tissues: No abnormality of the soft tissues of the neck.   Craniovertebral junction: The craniovertebral junction is  normal. No abnormality  of the visualized brain parenchyma.        CONCLUSION:  Cervical spondylosis in lordotic alignment and the following  specific findings:  1.  Foraminal stenosis is moderately severe on the right C5-6 and moderate on  the left C5-6 and bilaterally C6-7.  2.  Mild facet degeneration on the left C7-T1 and C2-3 and facet hypertrophy on  the right C4-5 and C3-4.    [facility]      Read by: Ca Peraza M.D.  Reviewed and Electronically Signed by: Ca Peraza M.D.

## 2022-02-16 NOTE — NURSING NOTE
Quick Intake Entered On:  11/29/2019 11:01 AM CST    Performed On:  11/29/2019 11:00 AM CST by Kallie Bradshaw RN               Summary   Advance Directive :   No   Height Measured :   72 in(Converted to: 6 ft 0 in, 182.88 cm)    Systolic Blood Pressure :   148 mmHg (HI)    Diastolic Blood Pressure :   88 mmHg (HI)    Mean Arterial Pressure :   108 mmHg   BP Method :   Manual   Race :      Languages :   English   Kallie Bradshaw RN - 11/29/2019 11:00 AM CST   More Vitals   Additional Vitals Info :   machine   Systolic Blood Pressure Repeat :   153 mmHg   Diastolic Blood Pressure Repeat :   88 mmHg   Kallie Bradshaw RN - 11/29/2019 11:00 AM CST

## 2022-02-16 NOTE — PROGRESS NOTES
Patient:   CHESTER MEDELLIN            MRN: [id number]            FIN: [id number]               Age:   55 years     Sex:  Male     :  1965   Associated Diagnoses:   Hyperlipidemia; Physical exam   Author:   Prudencio Olivo MD      Visit Information      Date of Service: 2020 08:00 am  Performing Location: [facility]  Encounter#: [id number]      Primary Care Provider (PCP):  Prudencio Olivo MD    NPI# [id number]      Referring Provider:  Prudencio Olivo MD    NPI# [id number]      Chief Complaint   2020 8:08 AM CDT     f/u per [facility] - ha's improved, BP improving, ringing in ears improved              Additional Information:No additional information recorded during visit.   Chief complaint and symptoms as noted above and confirmed with patient      History of Present Illness   2019: Crow presents with approximately 10-day history of persistent pains symmetrically in both upper arms primarily in both the bicep and tricep region.  Also describes a fullness and swelling in both axilla.  No fever chills.  No malaise.  No shortness of breath.  Does describe bilateral paresthesias in both hands.  Also describes some perioral sensation.  Symptoms have been persistent.  No association with exertion.  No similar episodes in the past.  No describe rash.  No arthritis complaints.  No described neck pain.  No arm or  strength weakness.    2020: Crow presents to clinic for 1 week follow-up after seeing [facility] this past week for sinus congestion and sinusitis complaints.  Labs unremarkable except for a mild leukopenia.  He shares having off-and-on symptoms for nearly a month with this illness.  His wife had similar episode as well.  Neither were tested for coronavirus.  He feels much better compared to last week.  Did take Sudafed on multiple days.  Brings in a recording of home blood pressures with systolic pressures typically in the 130s.          Review of Systems   Constitutional:  No fever, No  chills.    Eye:  Negative except as documented in history of present illness.    Ear/Nose/Mouth/Throat:  No nasal congestion.    Respiratory:  No shortness of breath, No cough, No sputum production.    Cardiovascular:  No chest pain, No palpitations, No peripheral edema, No syncope.    Gastrointestinal:  No nausea, No vomiting, No abdominal pain.    Genitourinary   Hematology/Lymphatics:  No swollen lymph glands.    Endocrine   Immunologic:  No malaise.    Musculoskeletal:  No joint pain, No muscle pain.    Neurologic:  Alert and oriented X4, Headache, No numbness, No tingling.       Health Status   Allergies:    Allergic Reactions (Selected)  No Known Medication Allergies   Medications:  (Selected)   ,    Medications          No Known Home Medications     Problem list:    All Problems  Corneal scarring / SNOMED CT 706001894 / Confirmed  Hiatal hernia / SNOMED CT 878688932 / Confirmed  IBS (irritable bowel syndrome) / SNOMED CT 45016657 / Confirmed  Obesity / SNOMED CT 5426385831 / Probable  Inactive: GERD (gastroesophageal reflux disease) / SNOMED CT 556807426  Resolved: Epidermoid cyst / SNOMED CT 1315522726  Resolved: HA (headache) / SNOMED CT 01111009  Resolved: Herpes zoster / SNOMED CT 2776976  Resolved: History of inguinal hernia repair / SNOMED CT 652169C8-[id number]  Resolved: Urticaria / SNOMED CT 46655438  Canceled: Hypothyroidism / ICD-9-.9      Histories   Past Medical History:    Active  Corneal scarring (419544623): Onset in 2007 at 41 years.  Comments:  10/29/2012 CDT 12:05 PM CDT - Amy Bearden  right eye superiorly - most likely seconardy to herpes zoster  Hiatal hernia (695556515)  IBS (irritable bowel syndrome) (86721684)  Resolved  Epidermoid cyst (7892849926): Onset on 2/17/2012 at 47 years.  Resolved.  Herpes zoster (0047210): Onset in the month of 5/2004 at 39 years  Resolved.  Comments:  11/8/2010 CST 5:54 PM CST - Yasmine Jaime  trigeminal nerve ophthalmic branch  distribution  History of inguinal hernia repair (142644I7-[id number]): Onset in  at 9 years.  Resolved.  Urticaria (45449811):  Resolved in  at 34 years.  HA (headache) (05358248):  Resolved.   Family History:    Suicide  Father ()  Kidney stone  Brother  Deep vein thrombosis  Father ()     Procedure history:    Screening for colon cancer (1580739207) on 12/10/2018 at 53 Years.  Comments:  2018 3:50 PM CST - Becky Pimentel MA result:  negative  Recommendation:  f/u 3yrs  Coronary artery calcium score (2456216361) on 2014 at 49 Years.  Comments:  2016 7:22 AM Beckie Ojeda CMA Calcium score - 0  Vasectomy (63.73) in  at 39 Years.  Cholecystectomy (02566770) in the month of 1998 at 33 Years.  History of repair of inguinal hernia (1667210728) in  at 9 Years.   Social History:        Alcohol Assessment            Current, Beer (12 oz), Wine (5 oz), 3-5 times per week, 2 drinks/episode average.  3 drinks/episode maximum.      Tobacco Assessment            Never      Substance Abuse Assessment            Never      Employment and Education Assessment            Employed, Work/School description: .      Home and Environment Assessment            Marital status: .      Exercise and Physical Activity Assessment            Exercise frequency: 1-2 times/week.        Physical Examination   vital signs stable, as noted above   Vital Signs   2020 8:12 AM CDT Systolic Blood Pressure 152 mmHg  HI    Diastolic Blood Pressure 80 mmHg    Mean Arterial Pressure 104 mmHg   2020 8:08 AM CDT Temperature Tympanic 96 DegF  LOW    Peripheral Pulse Rate 64 bpm    Systolic Blood Pressure 156 mmHg  HI    Diastolic Blood Pressure 80 mmHg    Mean Arterial Pressure 105 mmHg      Measurements from flowsheet : Measurements   2020 8:12 AM CDT Height Measured - Standard 72 in   2020 8:08 AM CDT Height Measured -  Standard 72 in    Weight Measured - Standard 212.8 lb    BSA 2.21 m2    Body Mass Index 28.86 kg/m2  HI      General:  Alert and oriented, No acute distress.    Eye:  Pupils are equal, round and reactive to light, Normal conjunctiva.    HENT:  Tympanic membranes are clear, Oral mucosa is moist, No pharyngeal erythema.    Neck:  Supple, Non-tender, No lymphadenopathy.    Respiratory:  Lungs are clear to auscultation, Respirations are non-labored, Breath sounds are equal.    Cardiovascular:  Normal rate, Regular rhythm, No murmur, No edema.    Neurologic:  Alert, Oriented.    Cognition and Speech:  Oriented, Speech clear and coherent.    Psychiatric:  Appropriate mood & affect.       Review / Management   Results review:  Lab results   9/1/2020 10:43 AM CDT Sodium Level 141 mmol/L    Potassium Level 4.1 mmol/L    Chloride Level 105 mmol/L    CO2 Level 27 mmol/L    AGAP 9    Glucose Level 115 mg/dL    BUN 18 mg/dL    Creatinine 0.99 mg/dL    BUN/Creat Ratio 18    eGFR  >60    eGFR Non-African American >60    Calcium Level 9.4 mg/dL    WBC 4.0    RBC 4.88    Hgb 16.2 g/dL    Hct 45.7 %    MCV 94 fL    MCH 33.2 pg    MCHC 35.4 g/dL    RDW 12.5 %    Platelet 214    MPV 9.8 fL    Sed Rate 15 mm/hr   9/1/2020 10:36 AM CDT C-Reactive Protein (CRP) 1.5 mg/L   .       Impression and Plan   Diagnosis     Hyperlipidemia (PPX88-ZN E78.5).     Physical exam (VQR71-VO Z00.00).       .) recent sinusitis - resolving  - reviewed labs from 9/1 - I would not pursue his mild leukopenia further  - likely viral component - unclear if COVID related, never tested and not advising testing now.  Advised to have other household monitor for any febrile illness features    .) elevated blood pressures - whitecoat vs. hypertension?  - monitoring home bps with reasonable control    plan as previously outlined:     .) HLPD   - low risk for future cardiac events   - normal coronary calcium scoring (6/2014); taken off statin    -  arrange for repeat coronary calcium scoring now    .) health maintenance   - FIT negative '17; will plan for Cologuard testing   - declines PSA screening   - PHQ9: 1   - monitor alcohol intake   - adult vaccinations updated   - h/o superficial thrombophlebitis    RTC in 6 months

## 2022-02-16 NOTE — LETTER
(Inserted Image. Unable to display)   August 17, 2021    CHESTER MEDELLIN  [address] [place] [address]            Dear CHESTER,      Thank you for selecting [facility] for your healthcare needs.    Our records indicate you are due for the following services:     Follow-up office visit.    (FYI   Regarding office visits: In some instances, a video visit or telephone visit may be offered as an option.)      To schedule an appointment or if you have further questions, please contact your clinic at [phone].      Powered by [email or web address]    Sincerely,    Prudencio Olivo MD

## 2022-02-22 ENCOUNTER — MEDICAL CORRESPONDENCE (OUTPATIENT)
Dept: HEALTH INFORMATION MANAGEMENT | Facility: CLINIC | Age: 57
End: 2022-02-22

## 2022-02-22 ENCOUNTER — OFFICE VISIT - RIVER FALLS (OUTPATIENT)
Dept: FAMILY MEDICINE | Facility: CLINIC | Age: 57
End: 2022-02-22

## 2022-02-23 ENCOUNTER — COMMUNICATION - RIVER FALLS (OUTPATIENT)
Dept: FAMILY MEDICINE | Facility: CLINIC | Age: 57
End: 2022-02-23

## 2022-02-23 LAB
BUN SERPL-MCNC: 16 MG/DL (ref 7–25)
BUN/CREAT RATIO - HISTORICAL: ABNORMAL (ref 6–22)
CALCIUM SERPL-MCNC: 9.9 MG/DL (ref 8.6–10.3)
CHLORIDE BLD-SCNC: 100 MMOL/L (ref 98–110)
CHOLEST SERPL-MCNC: 254 MG/DL
CHOLEST/HDLC SERPL: 3.8 {RATIO}
CO2 SERPL-SCNC: 28 MMOL/L (ref 20–32)
CREAT SERPL-MCNC: 0.96 MG/DL (ref 0.7–1.33)
EGFRCR SERPLBLD CKD-EPI 2021: 87 ML/MIN/1.73M2
ERYTHROCYTE [DISTWIDTH] IN BLOOD BY AUTOMATED COUNT: 12.5 % (ref 11–15)
GLUCOSE BLD-MCNC: 102 MG/DL (ref 65–99)
HCT VFR BLD AUTO: 46.3 % (ref 38.5–50)
HDLC SERPL-MCNC: 66 MG/DL
HGB BLD-MCNC: 16.2 GM/DL (ref 13.2–17.1)
LDLC SERPL CALC-MCNC: 167 MG/DL
MCH RBC QN AUTO: 33.1 PG (ref 27–33)
MCHC RBC AUTO-ENTMCNC: 35 GM/DL (ref 32–36)
MCV RBC AUTO: 94.5 FL (ref 80–100)
NONHDLC SERPL-MCNC: 188 MG/DL
PLATELET # BLD AUTO: 270 10*3/UL (ref 140–400)
PMV BLD: 10.4 FL (ref 7.5–12.5)
POTASSIUM BLD-SCNC: 4.5 MMOL/L (ref 3.5–5.3)
RBC # BLD AUTO: 4.9 10*6/UL (ref 4.2–5.8)
SODIUM SERPL-SCNC: 137 MMOL/L (ref 135–146)
TRIGL SERPL-MCNC: 101 MG/DL
WBC # BLD AUTO: 4.2 10*3/UL (ref 3.8–10.8)

## 2022-03-02 VITALS
HEART RATE: 84 BPM | HEIGHT: 71 IN | SYSTOLIC BLOOD PRESSURE: 160 MMHG | WEIGHT: 205.1 LBS | DIASTOLIC BLOOD PRESSURE: 98 MMHG | BODY MASS INDEX: 28.71 KG/M2 | OXYGEN SATURATION: 99 % | TEMPERATURE: 97.4 F

## 2022-03-02 NOTE — NURSING NOTE
Comprehensive Intake Entered On:  2/22/2022 10:22 AM CST    Performed On:  2/22/2022 10:19 AM CST by Beckie Canchola CMA               Summary   Chief Complaint :   physical - multiple concerns - chest pressure x 1wk - mid sternnum, continues with parasthesias, ear ringing, eye issues    Advance Directive :   No   Weight Measured :   205.1 lb(Converted to: 205 lb 2 oz, 93.032 kg)    Height Measured :   71 in(Converted to: 5 ft 11 in, 180.34 cm)    Body Mass Index :   28.6 kg/m2 (HI)    Body Surface Area :   2.16 m2   Systolic Blood Pressure :   164 mmHg (HI)    Diastolic Blood Pressure :   105 mmHg (HI)    Mean Arterial Pressure :   125 mmHg   Peripheral Pulse Rate :   84 bpm   Temperature Tympanic :   97.4 DegF(Converted to: 36.3 DegC)  (LOW)    Oxygen Saturation :   99 %   Race :      Languages :   English   Beckie Canchola CMA - 2/22/2022 10:19 AM CST   Health Status   Allergies Verified? :   Yes   Medication History Verified? :   Yes   Medical History Verified? :   Yes   Pre-Visit Planning Status :   Completed   Tobacco Use? :   Never smoker   Beckie Canchola CMA - 2/22/2022 10:19 AM CST   Social History   Social History   (As Of: 2/22/2022 10:22:22 AM CST)   Alcohol:        Current, Beer (12 oz), Wine (5 oz), 3-5 times per week, 2 drinks/episode average.  3 drinks/episode maximum.   (Last Updated: 3/9/2017 2:23:21 PM CST by Adwoa Esqueda)          Tobacco:        Never (less than 100 in lifetime)   (Last Updated: 2/22/2022 10:20:35 AM CST by Beckie Canchola CMA)          Electronic Cigarette/Vaping:        Electronic Cigarette Use: Never.   (Last Updated: 2/22/2022 10:20:37 AM CST by Beckie Canchola CMA)          Substance Abuse:        Never   (Last Updated: 11/8/2010 5:56:17 PM CST by Yasmine Jaime)          Employment/School:        Employed, Work/School description: .   (Last Updated: 2/22/2012 11:29:03 AM CST by Alexandra Antunez CMA)          Home/Environment:        Marital status: .    (Last Updated: 2/22/2012 11:29:17 AM CST by Alexandra Antunez CMA)          Exercise:        Exercise frequency: 1-2 times/week.   (Last Updated: 11/8/2010 5:51:52 PM CST by Yasmine Jaime)

## 2022-03-02 NOTE — TELEPHONE ENCOUNTER
---------------------  From: Prudencio Olivo MD   To: CHESTER MEDELLIN    Sent: 2/23/2022 9:14:30 AM CST  Subject: General Message     Labs demonstrating elevated (though stable) cholesterol levels.  Otherwise, no significant findings.       Results:  Date Result Name Ind Value Ref Range   2/22/2022 11:25 AM WBC  4.2 (3.8 - 10.8)   2/22/2022 11:25 AM RBC  4.90 (4.20 - 5.80)   2/22/2022 11:25 AM Hgb  16.2 gm/dL (13.2 - 17.1)   2/22/2022 11:25 AM Hct  46.3 % (38.5 - 50.0)   2/22/2022 11:25 AM MCV  94.5 fL (80.0 - 100.0)   2/22/2022 11:25 AM MCH ((H)) 33.1 pg (27.0 - 33.0)   2/22/2022 11:25 AM MCHC  35.0 gm/dL (32.0 - 36.0)   2/22/2022 11:25 AM RDW  12.5 % (11.0 - 15.0)   2/22/2022 11:25 AM Platelet  270 (140 - 400)   2/22/2022 11:25 AM MPV  10.4 fL (7.5 - 12.5)   2/22/2022 11:25 AM Cholesterol ((H)) 254 mg/dL ( - <200)   2/22/2022 11:25 AM HDL  66 mg/dL (> OR = 40 - )   2/22/2022 11:25 AM Triglyceride  101 mg/dL ( - <150)   2/22/2022 11:25 AM LDL ((H)) 167    2/22/2022 11:25 AM Cholesterol/HDL Ratio  3.8 ( - <5.0)   2/22/2022 11:25 AM Non-HDL Cholesterol ((H)) 188 ( - <130)   2/22/2022 11:25 AM Glucose Level ((H)) 102 mg/dL (65 - 99)   2/22/2022 11:25 AM BUN  16 mg/dL (7 - 25)   2/22/2022 11:25 AM Creatinine Level  0.96 mg/dL (0.70 - 1.33)   2/22/2022 11:25 AM eGFR  87 mL/min/1.73m2 (> OR = 60 - )   2/22/2022 11:25 AM eGFR African American  101 mL/min/1.73m2 (> OR = 60 - )   2/22/2022 11:25 AM BUN/Creat Ratio  NOT APPLICABLE (6 - 22)   2/22/2022 11:25 AM Sodium Level  137 mmol/L (135 - 146)   2/22/2022 11:25 AM Potassium Level  4.5 mmol/L (3.5 - 5.3)   2/22/2022 11:25 AM Chloride Level  100 mmol/L (98 - 110)   2/22/2022 11:25 AM CO2 Level  28 mmol/L (20 - 32)   2/22/2022 11:25 AM Calcium Level  9.9 mg/dL (8.6 - 10.3)

## 2022-03-02 NOTE — PROGRESS NOTES
Patient:   CHESTER CAUSEY            MRN: [id number]            FIN: [id number]               Age:   57 years     Sex:  Male     :  1965   Associated Diagnoses:   None   Author:   Prudencio Olivo MD      Visit Information      Date of Service: 2022 10:03 am  Performing Location: [facility]  Encounter#: [id number]      Primary Care Provider (PCP):  Prudencio Olivo MD    NPI# [id number]      Referring Provider:  Prudencio Olivo MD    NPI# [id number]      Chief Complaint   2022 10:19 AM CST   physical - multiple concerns - chest pressure x 1wk - mid sternnum, continues with parasthesias, ear ringing, eye issues      History of Present Illness   Chester Causey is a 57 year old man with no significant medical history presenting with chest tightness. He has had a dull constant chest tightness for ~7 days. It is located midline on his sternum, does not radiate, does not change with movement/position, is not associated with nausea, is not pleuritic. He denies any palpitations, SOB, cough, recent trauma. He has had a similar pain in the past, for which he took omeprazole and it resolved. He denies any recent diarrhea, constipation, bloody stools, food regurgitation, pain with swallowing. The pain does not change with eating, and he is tolerating his diet without issue. The pain does not stop him from his daily routine and it is not worse with exercise.       Review of Systems   Cardiovascular:       Chest pain: Midsternal.       Health Status   Medications:  (Selected)   Documented Medications  Documented  omeprazole 20 mg oral delayed release capsule: = 1 cap(s) ( 20 mg ), Oral, daily, # 90 cap(s), 0 Refill(s), Type: Maintenance      Physical Examination   Vital Signs   2022 10:22 AM CST Systolic Blood Pressure 160 mmHg  HI    Diastolic Blood Pressure 98 mmHg  HI    Mean Arterial Pressure 119 mmHg   2022 10:19 AM CST Temperature Tympanic 97.4 DegF  LOW    Peripheral Pulse Rate 84  bpm    Systolic Blood Pressure 164 mmHg  HI    Diastolic Blood Pressure 105 mmHg  HI    Mean Arterial Pressure 125 mmHg    Oxygen Saturation 99 %      Measurements from flowsheet : Measurements   2/22/2022 10:19 AM CST Height Measured - Standard 71 in    Weight Measured - Standard 205.1 lb    BSA 2.16 m2    Body Mass Index 28.6 kg/m2  HI      General: No acute distress  CV: Normal S1/S2, normal rate/rhythm, no gallops/murmurs/rubs  Chest: No chest wall tenderness to palpation  Pulm: Clear breath sounds in all fields  Abd: Normoactive bowel sounds. No tenderness to palpation in all quadrants      Review / Management   ECG interpretation:  Sinus Rhythm. Normal EKG.       Impression and Plan   Terry Causey is a 57 year old man with no significant medical history presenting with chest tightness. Cardiac cause unlikely, given benign EKG, no radiation, dyspnea, fatigue, exercise intolerance associated with the chest pain. PE unlikely given no SOB, no pleuritic quality and low risk. Costochondritis unlikely given lack of trauma/exertional trigger, no positional/movement trigger of pain and lack of pain to palpation. Possible etiology includes GERD, given previous incident that resolved w/ PPI, central sternal burning. Peptic ulcer disease less likely, given lack of change with pain when eating. Patient has already been on Omeprazole 20mg/day for 3 days before coming here, will continue to trial and see if pain resolves. Encouraged to follow up if pain is not resolving for further workup. BMP/CBC gotten today to assess for possible anemia 2/2 to potential peptic ulcer disease.     I have seen, examined and assessed patient today for Px and chest pain complaints.  Atypical, continuous substernal chest discomfort for >1 week without exertional component - more characteristic of GI origin.  Advised continued trial of PPI which he just started.  No recommending further cardiopulmonary work-up at this time.  Can evaluate  further if symptoms persist

## 2022-03-02 NOTE — NURSING NOTE
Hearing and Vision Screening Entered On:  2/22/2022 10:22 AM CST    Performed On:  2/22/2022 10:22 AM CST by Beckie Canchola CMA               Hearing and Vision Screening   Audiogram Result Right Ear :   Pass   Audiogram Result Left Ear :   Pass   Beckie Canchola CMA - 2/22/2022 10:22 AM CST

## 2022-03-02 NOTE — NURSING NOTE
Depression Screening Entered On:  2/22/2022 1:55 PM CST    Performed On:  2/22/2022 1:55 PM CST by Beckie Canchola CMA               Depression Screening   Little Interest - Pleasure in Activities :   Not at all   Feeling Down, Depressed, Hopeless :   Not at all   Initial Depression Screen Score :   0 Score   Poor Appetite or Overeating :   Not at all   Trouble Falling or Staying Asleep :   Not at all   Feeling Tired or Little Energy :   Not at all   Feeling Bad About Yourself :   Not at all   Trouble Concentrating :   Not at all   Moving or Speaking Slowly :   Not at all   Thoughts Better Off Dead or Hurting Self :   Not at all   Difficulty at Work, Home, Getting Along :   Not difficult at all   Detailed Depression Screen Score :   0    Total Depression Screen Score :   0    Beckie Canchola CMA - 2/22/2022 1:55 PM CST

## 2022-03-02 NOTE — PROGRESS NOTES
Patient:   CHESTER MEDELLIN            MRN: [id number]            FIN: [id number]               Age:   57 years     Sex:  Male     :  1965   Associated Diagnoses:   None   Author:   Geovani LAZAR, Prudencio      Procedure   EKG procedure   Indication: chest pain.     Position: supine.     EKG findings   Interpretation: by primary care provider.     Rhythm: heart rate  77  beats/min, sinus normal.     Axis: normal axis, normal configuration.     Within normal limits.     Intervals: NM normal, QRS, QT normal, QRS 112ms.     Normal EKG.     P waves: normal.     QRS complex: normal, no Q waves present.     ST-T-U complex: normal.     Interpretation: no ST-T wave abnormalities.     Discussed: with patient.        Impression and Plan   Diagnosis     Normal EKG.     Orders

## 2022-03-02 NOTE — NURSING NOTE
CAGE Assessment Entered On:  2/22/2022 1:55 PM CST    Performed On:  2/22/2022 1:55 PM CST by Beckie Canchola CMA               Assessment   Have you ever felt you should cut down on your drinking :   No   Have people annoyed you by criticizing your drinking :   No   Have you ever felt bad or guilty about your drinking :   No   Have you ever taken a drink first thing in the morning to steady your nerves or get rid of a hangover (Eye-opener) :   No   CAGE Score :   0    Beckie Canchola CMA - 2/22/2022 1:55 PM CST

## 2022-03-02 NOTE — NURSING NOTE
Vital Signs Entered On:  2/22/2022 10:22 AM CST    Performed On:  2/22/2022 10:22 AM CST by Beckie Canchola CMA               Vital Signs   Systolic Blood Pressure :   160 mmHg (HI)    Diastolic Blood Pressure :   98 mmHg (HI)    Mean Arterial Pressure :   119 mmHg   Beckie Canchola CMA - 2/22/2022 10:22 AM CST

## 2022-03-29 ENCOUNTER — TRANSFERRED RECORDS (OUTPATIENT)
Dept: HEALTH INFORMATION MANAGEMENT | Facility: CLINIC | Age: 57
End: 2022-03-29

## 2022-03-29 LAB — COLOGUARD-ABSTRACT: NEGATIVE

## 2022-07-24 ENCOUNTER — HEALTH MAINTENANCE LETTER (OUTPATIENT)
Age: 57
End: 2022-07-24

## 2022-10-02 ENCOUNTER — HEALTH MAINTENANCE LETTER (OUTPATIENT)
Age: 57
End: 2022-10-02

## 2023-08-11 ENCOUNTER — TRANSFERRED RECORDS (OUTPATIENT)
Dept: HEALTH INFORMATION MANAGEMENT | Facility: CLINIC | Age: 58
End: 2023-08-11

## 2023-08-12 ENCOUNTER — HEALTH MAINTENANCE LETTER (OUTPATIENT)
Age: 58
End: 2023-08-12

## 2023-08-15 ENCOUNTER — TRANSFERRED RECORDS (OUTPATIENT)
Dept: HEALTH INFORMATION MANAGEMENT | Facility: CLINIC | Age: 58
End: 2023-08-15

## 2023-10-30 ENCOUNTER — TRANSFERRED RECORDS (OUTPATIENT)
Dept: HEALTH INFORMATION MANAGEMENT | Facility: CLINIC | Age: 58
End: 2023-10-30

## 2023-11-16 ENCOUNTER — NURSE TRIAGE (OUTPATIENT)
Dept: NURSING | Facility: CLINIC | Age: 58
End: 2023-11-16

## 2023-11-16 NOTE — TELEPHONE ENCOUNTER
Nurse Triage SBAR    Situation:   -headaches  -hypertension    Background:   -Patient calling, It is okay to leave a detailed message at this number.   -has had neck compression in past (hand tingling, ringing in ears)    Assessment:   -for the past few weeks has has pressure in head and increased high blood   -BP at home 150s/95, 140s/100, and 137/90 (after exercise)  -head pressure radiates from side to front  -seems to wake up with it an it lasts all day  -chin tingling (bilateral) is new - thinks it may be from squinting  -pain is more dull, not severe  -no vomiting  -no fevers  -is not on BP meds  -is wondering if this is from the neck compression or something new (BP)    Recommendation:   See in Office Today or Tomorrow   -Warm transferred to central scheduling to make an appointment  -If nothing is available go to /[facility]  -Call back with and questions, concerns, or any change in symptoms  Care team: given his symptoms do you have any availability to see him in clinic- nothing is available per central scheduling, call back at [phone] and advise    RASHIDA ESPINOZA RN on 11/16/2023 at 1:35 PM     Reason for Disposition   Unexplained headache that is present > 24 hours   New headache and age > 50    Additional Information   Negative: Sounds like a life-threatening emergency to the triager   Negative: Systolic BP >= 160 OR Diastolic >= 100, and any cardiac (e.g., breathing difficulty, chest pain) or neurologic symptoms (e.g., new-onset blurred or double vision)   Negative: Symptom is main concern (e.g., headache, chest pain)   Negative: Low blood pressure is main concern   Negative: Pregnant 20 or more weeks (or postpartum < 6 weeks) with new hand or face swelling   Negative: Pregnant 20 or more weeks (or postpartum < 6 weeks) and Systolic BP >= 160 OR Diastolic >= 110   Negative: Patient sounds very sick or weak to the triager   Negative: Systolic BP >= 200 OR Diastolic >= 120 and having NO cardiac or  neurologic symptoms   Negative: Pregnant 20 or more weeks (or postpartum < 6 weeks) with Systolic BP >= 140 OR Diastolic >= 90   Negative: Systolic BP >= 180 OR Diastolic >= 110, and missed most recent dose of blood pressure medication   Negative: Systolic BP >= 180 OR Diastolic >= 110   Negative: Patient wants to be seen   Negative: Systolic BP >= 160 OR Diastolic >= 100   Negative: Systolic BP >= 130 OR Diastolic >= 80, and pregnant   Negative: Ran out of BP medications   Negative: Taking BP medications and feels is having side effects (e.g., impotence, cough, dizziness)   Negative: Sounds like a life-threatening emergency to the triager   Negative: Difficult to awaken or acting confused (e.g., disoriented, slurred speech)   Negative: Passed out (i.e., fainted, collapsed and was not responding)   Negative: Loss of speech or garbled speech and new-onset   Negative: Weakness of the face, arm or leg on one side of the body and new-onset   Negative: Numbness of the face, arm or leg on one side of the body and new-onset   Negative: Unable to walk without falling   Negative: Possibility of carbon monoxide exposure   Negative: Stiff neck (can't touch chin to chest)   Negative: SEVERE headache, states 'worst headache' of life   Negative: SEVERE headache, sudden-onset (i.e., reaching maximum intensity within seconds to 1 hour)   Negative: Severe pain in one eye   Negative: Patient sounds very sick or weak to the triager   Negative: SEVERE headache (e.g., excruciating) and has had severe headaches before   Negative: SEVERE headache and not relieved by pain meds   Negative: SEVERE headache and vomiting   Negative: SEVERE headache and fever   Negative: Loss of vision or double vision  (Exception: Same as prior migraines.)   Negative: Fever present > 3 days (72 hours)   Negative: Patient wants to be seen   Negative: New headache and weak immune system (e.g., HIV positive, cancer chemo, splenectomy, organ transplant, chronic  steroids)   Negative: Fever > 103 F (39.4 C)   Negative: Fever > 100.0 F (37.8 C) and has diabetes mellitus or a weak immune system (e.g., HIV positive, cancer chemotherapy, organ transplant, splenectomy, chronic steroids)    Protocols used: Blood Pressure - High-A-OH, Headache-A-OH

## 2023-11-21 ENCOUNTER — OFFICE VISIT (OUTPATIENT)
Dept: FAMILY MEDICINE | Facility: CLINIC | Age: 58
End: 2023-11-21

## 2023-11-21 VITALS
TEMPERATURE: 98.4 F | HEART RATE: 100 BPM | DIASTOLIC BLOOD PRESSURE: 91 MMHG | SYSTOLIC BLOOD PRESSURE: 147 MMHG | WEIGHT: 204 LBS | BODY MASS INDEX: 28.56 KG/M2 | OXYGEN SATURATION: 96 % | HEIGHT: 71 IN | RESPIRATION RATE: 16 BRPM

## 2023-11-21 DIAGNOSIS — H93.13 TINNITUS, BILATERAL: ICD-10-CM

## 2023-11-21 DIAGNOSIS — G44.219 EPISODIC TENSION-TYPE HEADACHE, NOT INTRACTABLE: ICD-10-CM

## 2023-11-21 DIAGNOSIS — E78.5 DYSLIPIDEMIA: ICD-10-CM

## 2023-11-21 DIAGNOSIS — Z00.00 HEALTHCARE MAINTENANCE: ICD-10-CM

## 2023-11-21 DIAGNOSIS — R20.2 PARESTHESIA: ICD-10-CM

## 2023-11-21 DIAGNOSIS — I10 ESSENTIAL HYPERTENSION: Primary | ICD-10-CM

## 2023-11-21 PROBLEM — K58.9 IRRITABLE BOWEL SYNDROME: Status: ACTIVE | Noted: 2023-11-21

## 2023-11-21 PROBLEM — K44.9 HIATAL HERNIA: Status: ACTIVE | Noted: 2023-11-21

## 2023-11-21 LAB
ALBUMIN UR-MCNC: 30 MG/DL
ANION GAP SERPL CALCULATED.3IONS-SCNC: 10 MMOL/L (ref 7–15)
APPEARANCE UR: CLEAR
BACTERIA #/AREA URNS HPF: ABNORMAL /HPF
BILIRUB UR QL STRIP: ABNORMAL
BUN SERPL-MCNC: 13.3 MG/DL (ref 6–20)
CALCIUM SERPL-MCNC: 9.4 MG/DL (ref 8.6–10)
CHLORIDE SERPL-SCNC: 102 MMOL/L (ref 98–107)
CHOLEST SERPL-MCNC: 230 MG/DL
COLOR UR AUTO: YELLOW
CREAT SERPL-MCNC: 0.89 MG/DL (ref 0.67–1.17)
CREAT UR-MCNC: 257 MG/DL
DEPRECATED HCO3 PLAS-SCNC: 27 MMOL/L (ref 22–29)
EGFRCR SERPLBLD CKD-EPI 2021: >90 ML/MIN/1.73M2
GLUCOSE SERPL-MCNC: 105 MG/DL (ref 70–99)
GLUCOSE UR STRIP-MCNC: NEGATIVE MG/DL
HDLC SERPL-MCNC: 69 MG/DL
HGB UR QL STRIP: NEGATIVE
KETONES UR STRIP-MCNC: 15 MG/DL
LDLC SERPL CALC-MCNC: 141 MG/DL
LEUKOCYTE ESTERASE UR QL STRIP: NEGATIVE
MICROALBUMIN UR-MCNC: 20.1 MG/L
MICROALBUMIN/CREAT UR: 7.82 MG/G CR (ref 0–17)
MUCOUS THREADS #/AREA URNS LPF: PRESENT /LPF
NITRATE UR QL: NEGATIVE
NONHDLC SERPL-MCNC: 161 MG/DL
PH UR STRIP: 7 [PH] (ref 5–7)
POTASSIUM SERPL-SCNC: 4.4 MMOL/L (ref 3.4–5.3)
RBC #/AREA URNS AUTO: ABNORMAL /HPF
SODIUM SERPL-SCNC: 139 MMOL/L (ref 135–145)
SP GR UR STRIP: 1.02 (ref 1–1.03)
SQUAMOUS #/AREA URNS AUTO: ABNORMAL /LPF
TRIGL SERPL-MCNC: 101 MG/DL
TSH SERPL DL<=0.005 MIU/L-ACNC: 1.89 UIU/ML (ref 0.3–4.2)
UROBILINOGEN UR STRIP-ACNC: 1 E.U./DL
WBC #/AREA URNS AUTO: ABNORMAL /HPF

## 2023-11-21 PROCEDURE — 84443 ASSAY THYROID STIM HORMONE: CPT | Performed by: INTERNAL MEDICINE

## 2023-11-21 PROCEDURE — 80061 LIPID PANEL: CPT | Performed by: INTERNAL MEDICINE

## 2023-11-21 PROCEDURE — 82043 UR ALBUMIN QUANTITATIVE: CPT | Performed by: INTERNAL MEDICINE

## 2023-11-21 PROCEDURE — 82570 ASSAY OF URINE CREATININE: CPT | Performed by: INTERNAL MEDICINE

## 2023-11-21 PROCEDURE — 99214 OFFICE O/P EST MOD 30 MIN: CPT | Performed by: INTERNAL MEDICINE

## 2023-11-21 PROCEDURE — 81001 URINALYSIS AUTO W/SCOPE: CPT | Performed by: INTERNAL MEDICINE

## 2023-11-21 PROCEDURE — 36415 COLL VENOUS BLD VENIPUNCTURE: CPT | Performed by: INTERNAL MEDICINE

## 2023-11-21 PROCEDURE — 80048 BASIC METABOLIC PNL TOTAL CA: CPT | Performed by: INTERNAL MEDICINE

## 2023-11-21 RX ORDER — FLUTICASONE PROPIONATE 50 MCG
2 SPRAY, SUSPENSION (ML) NASAL DAILY
COMMUNITY
Start: 2023-10-09

## 2023-11-21 RX ORDER — LOSARTAN POTASSIUM AND HYDROCHLOROTHIAZIDE 12.5; 5 MG/1; MG/1
1 TABLET ORAL DAILY
Qty: 90 TABLET | Refills: 3 | Status: SHIPPED | OUTPATIENT
Start: 2023-11-21

## 2023-11-21 ASSESSMENT — ENCOUNTER SYMPTOMS: HEADACHES: 1

## 2023-11-21 NOTE — PROGRESS NOTES
"  Assessment & Plan   Problem List Items Addressed This Visit          Nervous and Auditory    Episodic tension-type headache, not intractable     11/2023: new onset tension/retroorbital headaches  - in context of more recently observed HTN  - prior chronic tinnitus and hand paresthesias observed  - working conclusion that Has associated with untreated HTN   - reassess after achieving bp control.  If persists, would readdress neurocranial imaging         Paresthesia     2021 reporting of hand/feet paresthesias  - prior MRI cervical spine ([facility]): reportedly normal  - prior assessment by neurology - felt most c/w CTS         Tinnitus, bilateral     Previous ENT assessment; reportedly normal formal hearing testing            Endocrine    Dyslipidemia     - low risk for future cardiac events   - normal coronary calcium scoring (6/2014)   - repeat scan (2/201): raw score 22 (25-50th percentile)         Relevant Orders    Lipid panel reflex to direct LDL Fasting       Circulatory    Essential hypertension - Primary     current antihypertensive regimen: none  regimen changes: begin on losartan/hydrochlorothiazide 50/12.5mg daily  intolerance:   future titration/work-up plan:  - goal bp <130/80  - observed AV nicking by eye specialist  - questionable association with more recent onset tension HAs         Relevant Medications    losartan-hydrochlorothiazide (HYZAAR) 50-12.5 MG tablet    Other Relevant Orders    REVIEW OF HEALTH MAINTENANCE PROTOCOL ORDERS (Completed)    Basic metabolic panel    Basic metabolic panel    TSH with free T4 reflex    UA with Microscopic (Completed)    Albumin Random Urine Quantitative with Creat Ratio    Urine Microscopic Exam (Completed)       Other    Healthcare maintenance               BMI:   Estimated body mass index is 28.45 kg/m  as calculated from the following:    Height as of this encounter: 1.803 m (5' 11\").    Weight as of this encounter: 92.5 kg (204 lb).       FUTURE " "APPOINTMENTS:       - Follow-up visit in 2 months    Ajit Olivo MD  [facility]    Mik Maria is a 58 year old, presenting for the following health issues: Presents with concerns of intermittent headaches now for the past several weeks.  Describes a retro-orbital, pressure-like sensation primarily more on the anterior aspect of head, though can be bandlike across his head.  Rarely is unilateral.  No significant photophobia phonophobia.  No migrainous history.  Persistent tinnitus; has seen ENT previously.  Has trialed intranasal Flonase without any noticeable change in symptoms.  Did have previous hearing assessment which was reportedly normal the ENT conclusion.  Has noticed his blood pressures been consistently more elevated.  Has been monitoring at home more recently has consistently been high with systolic pressures more routinely in the 160s.  Stable degree of paresthesias in the tips of fingers in both hands.  We reviewed history with a remote MRI brain back in 2010.  Did have cervical MRI ordered by neurology a few years prior  Headache and Hypertension      11/21/2023     9:27 AM   Additional Questions   Roomed by Rosio       Headache     History of Present Illness       Headaches:   Since the patient's last clinic visit, headaches are: worsened  The patient is getting headaches:  Every day for past 2-3 weeks  He is able to do normal daily activities when he has a migraine.  The patient is taking the following rescue/relief medications:  Ibuprofen (Advil, Motrin)   Patient states \"The relief is inconsistent\" from the rescue/relief medications.   The patient is taking the following medications to prevent migraines:  No medications to prevent migraines  In the past 4 weeks, the patient has gone to an Urgent Care or Emergency Room 0 times times due to headaches.    He eats 2-3 servings of fruits and vegetables daily.He consumes 2 sweetened beverage(s) daily.He " "exercises with enough effort to increase his heart rate 20 to 29 minutes per day.  He exercises with enough effort to increase his heart rate 3 or less days per week.   He is taking medications regularly.         Review of Systems   Neurological:  Positive for headaches.            Objective    BP (!) 179/83 (BP Location: Right arm, Patient Position: Sitting, Cuff Size: Adult Large)   Pulse 100   Temp 98.4  F (36.9  C) (Tympanic)   Resp 16   Ht 1.803 m (5' 11\")   Wt 92.5 kg (204 lb)   SpO2 96%   BMI 28.45 kg/m    Body mass index is 28.45 kg/m .  Physical Exam   GENERAL: healthy, alert and no distress  NECK: no adenopathy, no asymmetry, masses, or scars and thyroid normal to palpation  RESP: lungs clear to auscultation - no rales, rhonchi or wheezes  CV: regular rate and rhythm, normal S1 S2, no S3 or S4, no murmur, click or rub, no peripheral edema and peripheral pulses strong  ABDOMEN: soft, nontender, no hepatosplenomegaly, no masses and bowel sounds normal  MS: no gross musculoskeletal defects noted, no edema  Neuro: Cranial nerves II through XII intact; normal passive range of motion of cervical spine without any inducible pain.  No tenderness to palpation along temples.  No inducible jaw claudication.              "

## 2023-11-21 NOTE — ASSESSMENT & PLAN NOTE
11/2023: new onset tension/retroorbital headaches  - in context of more recently observed HTN  - prior chronic tinnitus and hand paresthesias observed  - working conclusion that Has associated with untreated HTN   - reassess after achieving bp control.  If persists, would readdress neurocranial imaging

## 2023-11-21 NOTE — ASSESSMENT & PLAN NOTE
2021 reporting of hand/feet paresthesias  - prior MRI cervical spine ([facility]): reportedly normal  - prior assessment by neurology - felt most c/w CTS

## 2023-11-21 NOTE — ASSESSMENT & PLAN NOTE
- low risk for future cardiac events   - normal coronary calcium scoring (6/2014)   - repeat scan (2/201): raw score 22 (25-50th percentile)

## 2024-01-22 ENCOUNTER — LAB (OUTPATIENT)
Dept: LAB | Facility: CLINIC | Age: 59
End: 2024-01-22

## 2024-01-22 DIAGNOSIS — Z11.4 SCREENING FOR HIV (HUMAN IMMUNODEFICIENCY VIRUS): Primary | ICD-10-CM

## 2024-01-22 DIAGNOSIS — Z11.59 NEED FOR HEPATITIS C SCREENING TEST: ICD-10-CM

## 2024-01-22 DIAGNOSIS — I10 ESSENTIAL HYPERTENSION: ICD-10-CM

## 2024-01-22 LAB
ANION GAP SERPL CALCULATED.3IONS-SCNC: 9 MMOL/L (ref 7–15)
BUN SERPL-MCNC: 19.2 MG/DL (ref 8–23)
CALCIUM SERPL-MCNC: 9.4 MG/DL (ref 8.6–10)
CHLORIDE SERPL-SCNC: 100 MMOL/L (ref 98–107)
CREAT SERPL-MCNC: 0.97 MG/DL (ref 0.67–1.17)
DEPRECATED HCO3 PLAS-SCNC: 29 MMOL/L (ref 22–29)
EGFRCR SERPLBLD CKD-EPI 2021: 90 ML/MIN/1.73M2
GLUCOSE SERPL-MCNC: 118 MG/DL (ref 70–99)
HCV AB SERPL QL IA: NONREACTIVE
HIV 1+2 AB+HIV1 P24 AG SERPL QL IA: NONREACTIVE
POTASSIUM SERPL-SCNC: 4.1 MMOL/L (ref 3.4–5.3)
SODIUM SERPL-SCNC: 138 MMOL/L (ref 135–145)

## 2024-01-22 PROCEDURE — 36415 COLL VENOUS BLD VENIPUNCTURE: CPT

## 2024-01-22 PROCEDURE — 87389 HIV-1 AG W/HIV-1&-2 AB AG IA: CPT

## 2024-01-22 PROCEDURE — 80048 BASIC METABOLIC PNL TOTAL CA: CPT

## 2024-01-22 PROCEDURE — 86803 HEPATITIS C AB TEST: CPT

## 2024-01-24 ENCOUNTER — MYC MEDICAL ADVICE (OUTPATIENT)
Dept: FAMILY MEDICINE | Facility: CLINIC | Age: 59
End: 2024-01-24

## 2024-01-24 ENCOUNTER — OFFICE VISIT (OUTPATIENT)
Dept: FAMILY MEDICINE | Facility: CLINIC | Age: 59
End: 2024-01-24

## 2024-01-24 VITALS
RESPIRATION RATE: 14 BRPM | DIASTOLIC BLOOD PRESSURE: 86 MMHG | SYSTOLIC BLOOD PRESSURE: 128 MMHG | HEART RATE: 101 BPM | HEIGHT: 71 IN | TEMPERATURE: 98.5 F | WEIGHT: 205 LBS | BODY MASS INDEX: 28.7 KG/M2 | OXYGEN SATURATION: 100 %

## 2024-01-24 DIAGNOSIS — G44.219 EPISODIC TENSION-TYPE HEADACHE, NOT INTRACTABLE: Primary | ICD-10-CM

## 2024-01-24 DIAGNOSIS — H93.13 TINNITUS, BILATERAL: ICD-10-CM

## 2024-01-24 DIAGNOSIS — I10 ESSENTIAL HYPERTENSION: ICD-10-CM

## 2024-01-24 PROCEDURE — 99214 OFFICE O/P EST MOD 30 MIN: CPT | Performed by: INTERNAL MEDICINE

## 2024-01-24 NOTE — ASSESSMENT & PLAN NOTE
controlled  current antihypertensive regimen: losartan/hydrochlorothiazide 50/12.5mg daily  regimen changes: none  intolerance:   future titration/work-up plan:  - goal bp <130/80  - observed AV nicking by eye specialist  - questionable association with more recent onset tension HAs

## 2024-01-24 NOTE — TELEPHONE ENCOUNTER
I called and informed patient that [facility]'s code is going to be different than our clinics, I will fax order and patient can follow up with [facility]. Patient verbalized understanding. Encounter closed.

## 2024-01-24 NOTE — ASSESSMENT & PLAN NOTE
Previous ENT assessment; reportedly normal formal hearing testing  -Recommend he follow-up with ENT, specifically audiology to discuss further tinnitus rehab

## 2024-01-24 NOTE — PROGRESS NOTES
"  Assessment & Plan   Problem List Items Addressed This Visit          Nervous and Auditory    Episodic tension-type headache, not intractable - Primary     11/2023: new onset tension/retroorbital headaches  - in context of more recently observed HTN  - prior chronic tinnitus and hand paresthesias observed    1/2024: General improvement in headaches temporarily observed with blood pressure improvement  -Still however with persistent tinnitus and paresthesias in hands and face  -Consensus to move forward with MRI of brain to rule out any other intracranial pathology         Relevant Orders    MR Brain w/o Contrast    Tinnitus, bilateral     Previous ENT assessment; reportedly normal formal hearing testing  -Recommend he follow-up with ENT, specifically audiology to discuss further tinnitus rehab         Relevant Orders    MR Brain w/o Contrast       Circulatory    Essential hypertension     controlled  current antihypertensive regimen: losartan/hydrochlorothiazide 50/12.5mg daily  regimen changes: none  intolerance:   future titration/work-up plan:  - goal bp <130/80  - observed AV nicking by eye specialist  - questionable association with more recent onset tension HAs                BMI  Estimated body mass index is 28.59 kg/m  as calculated from the following:    Height as of this encounter: 1.803 m (5' 11\").    Weight as of this encounter: 93 kg (205 lb).       FUTURE APPOINTMENTS:       - Follow-up visit in 11/2023      Mik Maria is a 59 year old, presenting for the following health issues: Presents for follow-up related to his hypertension.  He was started on losartan hydrochlorothiazide which he has tolerated well.  Both his home blood pressure and in clinic readings have improved since that time.  His frequency of a generalized headache have also improved since medication introduction.  However he has had persistent tinnitus, which is especially more bothersome at night.  Had seen ears nose and throat " "provider last summer no follow-up since.  Will get transient numbness feeling occurring in hands sometimes around his face and lips.  No visual changes.  Did have a remote MRI brain in 2010  Hypertension        1/24/2024     6:55 AM   Additional Questions   Roomed by Larissa TODD     History of Present Illness       Hypertension: He presents for follow up of hypertension.  He does check blood pressure  regularly outside of the clinic. Outpatient blood pressures have not been over 140/90. He does not follow a low salt diet.     He eats 2-3 servings of fruits and vegetables daily.He consumes 1 sweetened beverage(s) daily.He exercises with enough effort to increase his heart rate 10 to 19 minutes per day.  He exercises with enough effort to increase his heart rate 3 or less days per week.   He is taking medications regularly.          Review of Systems  Constitutional, HEENT, cardiovascular, pulmonary, gi and gu systems are negative, except as otherwise noted.      Objective    /86   Pulse 101   Temp 98.5  F (36.9  C)   Resp 14   Ht 1.803 m (5' 11\")   Wt 93 kg (205 lb)   SpO2 100%   BMI 28.59 kg/m    Body mass index is 28.59 kg/m .  Physical Exam   GENERAL: alert and no distress  NECK: no adenopathy, no asymmetry, masses, or scars  RESP: lungs clear to auscultation - no rales, rhonchi or wheezes  CV: regular rate and rhythm, normal S1 S2, no S3 or S4, no murmur, click or rub, no peripheral edema  ABDOMEN: soft, nontender, no hepatosplenomegaly, no masses and bowel sounds normal  MS: no gross musculoskeletal defects noted, no edema  CN II-XII intact.  No gross sensorimotor deficits    Lab on 01/22/2024   Component Date Value Ref Range Status    Sodium 01/22/2024 138  135 - 145 mmol/L Final    Reference intervals for this test were updated on 09/26/2023 to more accurately reflect our healthy population. There may be differences in the flagging of prior results with similar values performed with this " method. Interpretation of those prior results can be made in the context of the updated reference intervals.     Potassium 01/22/2024 4.1  3.4 - 5.3 mmol/L Final    Chloride 01/22/2024 100  98 - 107 mmol/L Final    Carbon Dioxide (CO2) 01/22/2024 29  22 - 29 mmol/L Final    Anion Gap 01/22/2024 9  7 - 15 mmol/L Final    Urea Nitrogen 01/22/2024 19.2  8.0 - 23.0 mg/dL Final    Creatinine 01/22/2024 0.97  0.67 - 1.17 mg/dL Final    GFR Estimate 01/22/2024 90  >60 mL/min/1.73m2 Final    Calcium 01/22/2024 9.4  8.6 - 10.0 mg/dL Final    Glucose 01/22/2024 118 (H)  70 - 99 mg/dL Final    HIV Antigen Antibody Combo 01/22/2024 Nonreactive  Nonreactive Final    Negative HIV-1/-2 antigen and antibody screening test results usually indicate the absence of HIV-1 and HIV-2 infection. However, such negative results do not rule-out acute HIV infection.  If acute HIV-1 or HIV-2 infection is suspected, detection of HIV-1 or HIV-2 RNA  is recommended.     Hepatitis C Antibody 01/22/2024 Nonreactive  Nonreactive Final    A nonreactive screening test result does not exclude the possibility of exposure to or infection with HCV. Nonreactive screening test results in individuals with prior exposure to HCV may be due to antibody levels below the limit of detection of this assay or lack of reactivity to the HCV antigens used in this assay. Patients with recent HCV infections (<3 months from time of exposure) may have false-negative HCV antibody results due to the time needed for seroconversion (average of 8 to 9 weeks).           Signed Electronically by: Ajit Olivo MD

## 2024-01-24 NOTE — ASSESSMENT & PLAN NOTE
11/2023: new onset tension/retroorbital headaches  - in context of more recently observed HTN  - prior chronic tinnitus and hand paresthesias observed    1/2024: General improvement in headaches temporarily observed with blood pressure improvement  -Still however with persistent tinnitus and paresthesias in hands and face  -Consensus to move forward with MRI of brain to rule out any other intracranial pathology

## 2024-10-05 ENCOUNTER — HEALTH MAINTENANCE LETTER (OUTPATIENT)
Age: 59
End: 2024-10-05

## 2024-12-10 SDOH — HEALTH STABILITY: PHYSICAL HEALTH: ON AVERAGE, HOW MANY MINUTES DO YOU ENGAGE IN EXERCISE AT THIS LEVEL?: 30 MIN

## 2024-12-10 SDOH — HEALTH STABILITY: PHYSICAL HEALTH: ON AVERAGE, HOW MANY DAYS PER WEEK DO YOU ENGAGE IN MODERATE TO STRENUOUS EXERCISE (LIKE A BRISK WALK)?: 3 DAYS

## 2024-12-10 ASSESSMENT — SOCIAL DETERMINANTS OF HEALTH (SDOH): HOW OFTEN DO YOU GET TOGETHER WITH FRIENDS OR RELATIVES?: TWICE A WEEK

## 2024-12-11 ENCOUNTER — OFFICE VISIT (OUTPATIENT)
Dept: FAMILY MEDICINE | Facility: CLINIC | Age: 59
End: 2024-12-11

## 2024-12-11 VITALS
WEIGHT: 213 LBS | SYSTOLIC BLOOD PRESSURE: 126 MMHG | RESPIRATION RATE: 12 BRPM | OXYGEN SATURATION: 97 % | BODY MASS INDEX: 29.82 KG/M2 | HEART RATE: 92 BPM | DIASTOLIC BLOOD PRESSURE: 92 MMHG | HEIGHT: 71 IN | TEMPERATURE: 98.6 F

## 2024-12-11 DIAGNOSIS — Z00.00 HEALTH CARE MAINTENANCE: ICD-10-CM

## 2024-12-11 DIAGNOSIS — Z00.00 HEALTHCARE MAINTENANCE: ICD-10-CM

## 2024-12-11 DIAGNOSIS — E78.5 DYSLIPIDEMIA: ICD-10-CM

## 2024-12-11 DIAGNOSIS — G44.219 EPISODIC TENSION-TYPE HEADACHE, NOT INTRACTABLE: ICD-10-CM

## 2024-12-11 DIAGNOSIS — Z12.12 SCREENING FOR COLORECTAL CANCER: ICD-10-CM

## 2024-12-11 DIAGNOSIS — Z13.6 SCREENING FOR CARDIOVASCULAR CONDITION: Primary | ICD-10-CM

## 2024-12-11 DIAGNOSIS — I10 ESSENTIAL HYPERTENSION: ICD-10-CM

## 2024-12-11 DIAGNOSIS — Z12.11 SCREENING FOR COLORECTAL CANCER: ICD-10-CM

## 2024-12-11 LAB
ANION GAP SERPL CALCULATED.3IONS-SCNC: 13 MMOL/L (ref 7–15)
BUN SERPL-MCNC: 16.6 MG/DL (ref 8–23)
CALCIUM SERPL-MCNC: 9.7 MG/DL (ref 8.8–10.4)
CHLORIDE SERPL-SCNC: 100 MMOL/L (ref 98–107)
CHOLEST SERPL-MCNC: 257 MG/DL
CREAT SERPL-MCNC: 1 MG/DL (ref 0.67–1.17)
EGFRCR SERPLBLD CKD-EPI 2021: 87 ML/MIN/1.73M2
FASTING STATUS PATIENT QL REPORTED: NO
FASTING STATUS PATIENT QL REPORTED: NO
GLUCOSE SERPL-MCNC: 106 MG/DL (ref 70–99)
HCO3 SERPL-SCNC: 26 MMOL/L (ref 22–29)
HDLC SERPL-MCNC: 79 MG/DL
LDLC SERPL CALC-MCNC: 164 MG/DL
NONHDLC SERPL-MCNC: 178 MG/DL
POTASSIUM SERPL-SCNC: 4.4 MMOL/L (ref 3.4–5.3)
SODIUM SERPL-SCNC: 139 MMOL/L (ref 135–145)
TRIGL SERPL-MCNC: 70 MG/DL

## 2024-12-11 PROCEDURE — 80048 BASIC METABOLIC PNL TOTAL CA: CPT | Performed by: INTERNAL MEDICINE

## 2024-12-11 PROCEDURE — 36415 COLL VENOUS BLD VENIPUNCTURE: CPT | Performed by: INTERNAL MEDICINE

## 2024-12-11 PROCEDURE — 80061 LIPID PANEL: CPT | Performed by: INTERNAL MEDICINE

## 2024-12-11 RX ORDER — LOSARTAN POTASSIUM AND HYDROCHLOROTHIAZIDE 12.5; 5 MG/1; MG/1
1 TABLET ORAL DAILY
Qty: 90 TABLET | Refills: 3 | Status: SHIPPED | OUTPATIENT
Start: 2024-12-11

## 2024-12-11 NOTE — ASSESSMENT & PLAN NOTE
- low risk for future cardiac events   - normal coronary calcium scoring (6/2014)   - repeat scan (2/2021): raw score 22 (25-50th percentile)

## 2024-12-11 NOTE — ASSESSMENT & PLAN NOTE
CRC: cologuard negative; due 4/2025  Suspected remote history of shingles -discussed likelihood of recurrence quite low  Generally avoidant of other adult vaccinations though would encourage Prevnar at age 65

## 2024-12-11 NOTE — PROGRESS NOTES
Preventive Care Visit  [facility]  Ajit Olivo MD, Internal Medicine  Dec 11, 2024      Assessment & Plan   Problem List Items Addressed This Visit          Nervous and Auditory    Episodic tension-type headache, not intractable     11/2023: new onset tension/retroorbital headaches  - in context of more recently observed HTN  - prior chronic tinnitus and hand paresthesias observed    1/2024: General improvement in headaches temporarily observed with blood pressure improvement  -Still however with persistent tinnitus and paresthesias in hands and face  -Consensus to move forward with MRI of brain to rule out any other intracranial pathology            Endocrine    Dyslipidemia     - low risk for future cardiac events   - normal coronary calcium scoring (6/2014)   - repeat scan (2/2021): raw score 22 (25-50th percentile)         Relevant Orders    Lipid panel reflex to direct LDL Non-fasting       Circulatory    Essential hypertension     controlled  current antihypertensive regimen: losartan/hydrochlorothiazide 50/12.5mg daily  regimen changes: none  intolerance:   future titration/work-up plan:  - goal bp <130/80  - observed AV nicking by eye specialist  - questionable association with more recent onset tension HAs         Relevant Medications    losartan-hydrochlorothiazide (HYZAAR) 50-12.5 MG tablet       Other    Healthcare maintenance     CRC: cologuard negative; due 4/2025  Suspected remote history of shingles -discussed likelihood of recurrence quite low  Generally avoidant of other adult vaccinations though would encourage Prevnar at age 65          Other Visit Diagnoses       Screening for cardiovascular condition    -  Primary    Health care maintenance        Relevant Orders    REVIEW OF HEALTH MAINTENANCE PROTOCOL ORDERS (Completed)    Screening for colorectal cancer        Relevant Orders    COLOGUARD([employer])                  BMI  Estimated body mass index is 29.71  "kg/m  as calculated from the following:    Height as of this encounter: 1.803 m (5' 11\").    Weight as of this encounter: 96.6 kg (213 lb).   Weight management plan: Discussed healthy diet and exercise guidelines    Counseling  Appropriate preventive services were addressed with this patient via screening, questionnaire, or discussion as appropriate for fall prevention, nutrition, physical activity, Tobacco-use cessation, social engagement, weight loss and cognition.  Checklist reviewing preventive services available has been given to the patient.  Reviewed patient's diet, addressing concerns and/or questions.   He is at risk for lack of exercise and has been provided with information to increase physical activity for the benefit of his well-being.     FUTURE APPOINTMENTS:       - Follow-up visit in 12 months      Subjective   Crow is a 59 year old, presenting for the following: Returns for annual physical.  Generally doing well.  Prior issues with headaches have generally resolved -he thinks related to blood pressure control overall.  Still with some intermittent tinnitus though generally has been doing better as well.  Physical        12/11/2024     9:13 AM   Additional Questions   Roomed by Larissa BRADY    Health Care Directive  Patient does not have a Health Care Directive: Discussed advance care planning with patient; information given to patient to review.      12/10/2024   General Health   How would you rate your overall physical health? Good   Feel stress (tense, anxious, or unable to sleep) Not at all            12/10/2024   Nutrition   Three or more servings of calcium each day? Yes   Diet: Regular (no restrictions)   How many servings of fruit and vegetables per day? (!) 2-3   How many sweetened beverages each day? 0-1            12/10/2024   Exercise   Days per week of moderate/strenous exercise 3 days   Average minutes spent exercising at this level 30 min            12/10/2024   Social " "Factors   Frequency of gathering with friends or relatives Twice a week   Worry food won't last until get money to buy more No   Food not last or not have enough money for food? No   Do you have housing? (Housing is defined as stable permanent housing and does not include staying ouside in a car, in a tent, in an abandoned building, in an overnight shelter, or couch-surfing.) Yes   Are you worried about losing your housing? No   Lack of transportation? No   Unable to get utilities (heat,electricity)? No            12/10/2024   Fall Risk   Fallen 2 or more times in the past year? No    Trouble with walking or balance? No        Patient-reported          12/10/2024   Dental   Dentist two times every year? Yes            12/10/2024   TB Screening   Were you born outside of the [place]? No              Today's PHQ-2 Score:       1/24/2024     6:51 AM   PHQ-2 ( 1999 [employer])   Q1: Little interest or pleasure in doing things 0    Q2: Feeling down, depressed or hopeless 0    PHQ-2 Score 0   Q1: Little interest or pleasure in doing things Not at all   Q2: Feeling down, depressed or hopeless Not at all   PHQ-2 Score 0       Patient-reported         12/10/2024   Substance Use   Alcohol more than 3/day or more than 7/wk No   Do you use any other substances recreationally? No        Social History     Tobacco Use    Smoking status: Never     Passive exposure: Never    Smokeless tobacco: Never   Vaping Use    Vaping status: Never Used   Substance Use Topics    Alcohol use: Yes    Drug use: Never           12/10/2024   STI Screening   New sexual partner(s) since last STI/HIV test? No      Last PSA: No results found for: \"PSA\"  ASCVD Risk   The 10-year ASCVD risk score (Oly GRACIA, et al., 2019) is: 8.1%    Values used to calculate the score:      Age: 59 years      Sex: Male      Is Non- : No      Diabetic: No      Tobacco smoker: No      Systolic Blood Pressure: 126 mmHg      Is BP treated: Yes      HDL " "Cholesterol: 69 mg/dL      Total Cholesterol: 230 mg/dL           Reviewed and updated as needed this visit by Provider     Meds                  Review of Systems  Constitutional, HEENT, cardiovascular, pulmonary, gi and gu systems are negative, except as otherwise noted.     Objective    Exam  BP (!) 126/92   Pulse 92   Temp 98.6  F (37  C)   Resp 12   Ht 1.803 m (5' 11\")   Wt 96.6 kg (213 lb)   SpO2 97%   BMI 29.71 kg/m     Estimated body mass index is 29.71 kg/m  as calculated from the following:    Height as of this encounter: 1.803 m (5' 11\").    Weight as of this encounter: 96.6 kg (213 lb).    Physical Exam  GENERAL: alert and no distress  NECK: no adenopathy, no asymmetry, masses, or scars  RESP: lungs clear to auscultation - no rales, rhonchi or wheezes  CV: regular rate and rhythm, normal S1 S2, no S3 or S4, no murmur, click or rub, no peripheral edema  ABDOMEN: soft, nontender, no hepatosplenomegaly, no masses and bowel sounds normal  MS: no gross musculoskeletal defects noted, no edema        Signed Electronically by: Ajit Olivo MD    "

## 2025-04-11 ENCOUNTER — ORDERS ONLY (AUTO-RELEASED) (OUTPATIENT)
Dept: FAMILY MEDICINE | Facility: CLINIC | Age: 60
End: 2025-04-11

## 2025-04-11 DIAGNOSIS — Z12.12 SCREENING FOR COLORECTAL CANCER: ICD-10-CM

## 2025-04-11 DIAGNOSIS — Z12.11 SCREENING FOR COLORECTAL CANCER: ICD-10-CM

## 2025-06-26 ENCOUNTER — OFFICE VISIT (OUTPATIENT)
Dept: FAMILY MEDICINE | Facility: CLINIC | Age: 60
End: 2025-06-26

## 2025-06-26 ENCOUNTER — ORDERS ONLY (AUTO-RELEASED) (OUTPATIENT)
Dept: FAMILY MEDICINE | Facility: CLINIC | Age: 60
End: 2025-06-26

## 2025-06-26 VITALS
BODY MASS INDEX: 29.54 KG/M2 | DIASTOLIC BLOOD PRESSURE: 100 MMHG | OXYGEN SATURATION: 98 % | TEMPERATURE: 97.8 F | RESPIRATION RATE: 20 BRPM | HEIGHT: 71 IN | HEART RATE: 83 BPM | WEIGHT: 211 LBS | SYSTOLIC BLOOD PRESSURE: 144 MMHG

## 2025-06-26 DIAGNOSIS — Z13.220 SCREENING CHOLESTEROL LEVEL: ICD-10-CM

## 2025-06-26 DIAGNOSIS — Z00.00 HEALTH CARE MAINTENANCE: Primary | ICD-10-CM

## 2025-06-26 DIAGNOSIS — R10.9 RIGHT FLANK PAIN: ICD-10-CM

## 2025-06-26 DIAGNOSIS — Z12.11 SCREEN FOR COLON CANCER: ICD-10-CM

## 2025-06-26 LAB
ALBUMIN UR-MCNC: NEGATIVE MG/DL
APPEARANCE UR: CLEAR
BACTERIA #/AREA URNS HPF: ABNORMAL /HPF
BASOPHILS # BLD AUTO: 0 10E3/UL (ref 0–0.2)
BASOPHILS NFR BLD AUTO: 1 %
BILIRUB UR QL STRIP: ABNORMAL
COLOR UR AUTO: ABNORMAL
EOSINOPHIL # BLD AUTO: 0.1 10E3/UL (ref 0–0.7)
EOSINOPHIL NFR BLD AUTO: 3 %
ERYTHROCYTE [DISTWIDTH] IN BLOOD BY AUTOMATED COUNT: 11.3 % (ref 10–15)
GLUCOSE UR STRIP-MCNC: NEGATIVE MG/DL
GRAN CASTS #/AREA URNS LPF: ABNORMAL /LPF
HCT VFR BLD AUTO: 42.8 % (ref 40–53)
HGB BLD-MCNC: 15.1 G/DL (ref 13.3–17.7)
HGB UR QL STRIP: NEGATIVE
HYALINE CASTS #/AREA URNS LPF: ABNORMAL /LPF
IMM GRANULOCYTES # BLD: 0 10E3/UL
IMM GRANULOCYTES NFR BLD: 0 %
KETONES UR STRIP-MCNC: NEGATIVE MG/DL
LEUKOCYTE ESTERASE UR QL STRIP: NEGATIVE
LYMPHOCYTES # BLD AUTO: 1.4 10E3/UL (ref 0.8–5.3)
LYMPHOCYTES NFR BLD AUTO: 33 %
MCH RBC QN AUTO: 32.5 PG (ref 26.5–33)
MCHC RBC AUTO-ENTMCNC: 35.3 G/DL (ref 31.5–36.5)
MCV RBC AUTO: 92 FL (ref 78–100)
MONOCYTES # BLD AUTO: 0.4 10E3/UL (ref 0–1.3)
MONOCYTES NFR BLD AUTO: 10 %
MUCOUS THREADS #/AREA URNS LPF: PRESENT /LPF
NEUTROPHILS # BLD AUTO: 2.3 10E3/UL (ref 1.6–8.3)
NEUTROPHILS NFR BLD AUTO: 54 %
NITRATE UR QL: NEGATIVE
PH UR STRIP: 5.5 [PH] (ref 5–7)
PLATELET # BLD AUTO: 241 10E3/UL (ref 150–450)
RBC # BLD AUTO: 4.64 10E6/UL (ref 4.4–5.9)
RBC #/AREA URNS AUTO: ABNORMAL /HPF
SP GR UR STRIP: 1.02 (ref 1–1.03)
SQUAMOUS #/AREA URNS AUTO: ABNORMAL /LPF
TRANS CELLS #/AREA URNS HPF: ABNORMAL /HPF
UROBILINOGEN UR STRIP-ACNC: 0.2 E.U./DL
WBC # BLD AUTO: 4.2 10E3/UL (ref 4–11)
WBC #/AREA URNS AUTO: ABNORMAL /HPF
WBC CLUMPS #/AREA URNS HPF: PRESENT /HPF

## 2025-06-26 RX ORDER — CYCLOBENZAPRINE HCL 5 MG
5-10 TABLET ORAL 3 TIMES DAILY PRN
Qty: 30 TABLET | Refills: 1 | Status: SHIPPED | OUTPATIENT
Start: 2025-06-26

## 2025-06-26 ASSESSMENT — ENCOUNTER SYMPTOMS: BACK PAIN: 1

## 2025-06-26 NOTE — PROGRESS NOTES
"  Assessment & Plan       Right flank pain  Approximately 1 month history of intermittent paraspinal upper thoracic and upper lumbar region pain  Differential diagnosis: High suspicion for likely musculoskeletal origin, possible renal colic from kidney stone.  Low suspicions for intra-abdominal pathology  No reproducible features on-physical exam or range of motion thoracic lumbar back stretching  No CVA tenderness  Checking urinalysis; if does have evidence of microhematuria, would plan to pursue nonemergent CT stone protocol  Checking CBC, BMP, liver function studies  Will trial cyclobenzaprine to see if any impact on suspected back spasm    - UA with Microscopic; Future  - Basic metabolic panel; Future  - CBC with Platelets & Differential; Future  - Hepatic panel (Albumin, ALT, AST, Bili, Alk Phos, TP); Future  - cyclobenzaprine (FLEXERIL) 5 MG tablet; Take 1-2 tablets (5-10 mg) by mouth 3 times daily as needed for muscle spasms.            BMI  Estimated body mass index is 29.43 kg/m  as calculated from the following:    Height as of this encounter: 1.803 m (5' 11\").    Weight as of this encounter: 95.7 kg (211 lb).         Subjective   Crow is a 60 year old, presenting for the following health issues: Presents with approximately 1 month history of intermittent right sided both thoracic and lumbar centered pains.  Says he will feel an ache in the scapular region.  A separate type of pain he will feel in the right flank region that area at times has much more significant quality of pain sometimes radiating through to the front.  Remote history of cholecystectomy.  Does have a history of recurrent kidney stones.  States the quality of pain does not feel is reminiscent of previous kidney stones.  No gross hematuria.  No fever no chills.  Does state generally getting out of bed has a more acute quality of pain though later through the day pain is more persistent and dull.  His main concern is whether this has any " "correlation to any internal pathology  Back Pain        6/26/2025     3:43 PM   Additional Questions   Roomed by Larissa TODD     Back Pain   This is a new problem. The current episode started more than 1 week ago. The problem occurs constantly. The problem has not changed since onset.  History of Present Illness       Reason for visit:  Right side back pain    He eats 2-3 servings of fruits and vegetables daily.He consumes 1 sweetened beverage(s) daily.He exercises with enough effort to increase his heart rate 9 or less minutes per day.  He exercises with enough effort to increase his heart rate 3 or less days per week. He is missing 1 dose(s) of medications per week.  He is not taking prescribed medications regularly due to remembering to take.            Review of Systems  Constitutional, HEENT, cardiovascular, pulmonary, gi and gu systems are negative, except as otherwise noted.      Objective    BP (!) 183/103   Pulse 83   Temp 97.8  F (36.6  C)   Resp 20   Ht 1.803 m (5' 11\")   Wt 95.7 kg (211 lb)   SpO2 98%   BMI 29.43 kg/m    Body mass index is 29.43 kg/m .  Physical Exam   GENERAL: alert and no distress  NECK: no adenopathy, no asymmetry, masses, or scars  RESP: lungs clear to auscultation - no rales, rhonchi or wheezes  CV: regular rate and rhythm, normal S1 S2, no S3 or S4, no murmur, click or rub, no peripheral edema  ABDOMEN: soft, nontender, no hepatosplenomegaly, no masses and bowel sounds normal  MS: no gross musculoskeletal defects noted, no edema            Signed Electronically by: Ajit Olivo MD    "

## 2025-06-27 ENCOUNTER — RESULTS FOLLOW-UP (OUTPATIENT)
Dept: FAMILY MEDICINE | Facility: CLINIC | Age: 60
End: 2025-06-27